# Patient Record
Sex: FEMALE | Race: OTHER | Employment: UNEMPLOYED | URBAN - METROPOLITAN AREA
[De-identification: names, ages, dates, MRNs, and addresses within clinical notes are randomized per-mention and may not be internally consistent; named-entity substitution may affect disease eponyms.]

---

## 2017-10-24 LAB
HBSAG, EXTERNAL: NORMAL
HEPATITIS C AB,   EXT: NORMAL
HIV, EXTERNAL: NORMAL
RPR, EXTERNAL: NORMAL
RUBELLA, EXTERNAL: NORMAL

## 2017-11-14 LAB
CHLAMYDIA, EXTERNAL: NORMAL
N. GONORRHEA, EXTERNAL: NORMAL

## 2018-05-20 ENCOUNTER — HOSPITAL ENCOUNTER (EMERGENCY)
Age: 28
Discharge: HOME | End: 2018-05-20
Attending: OBSTETRICS & GYNECOLOGY | Admitting: OBSTETRICS & GYNECOLOGY
Payer: MEDICAID

## 2018-05-20 VITALS — BODY MASS INDEX: 30.66 KG/M2 | WEIGHT: 157 LBS

## 2018-05-20 PROBLEM — O36.8130 DECREASED FETAL MOVEMENT AFFECTING MANAGEMENT OF PREGNANCY IN THIRD TRIMESTER: Status: ACTIVE | Noted: 2018-05-20

## 2018-05-20 PROCEDURE — 99282 EMERGENCY DEPT VISIT SF MDM: CPT

## 2018-05-20 NOTE — DISCHARGE INSTRUCTIONS
Scott Scarce a luevano médico zoraida el embarazo (después de 20 semanas) - [ Learning About When to Call Your Doctor During Pregnancy (After 20 Weeks) ]  Instrucciones de cuidado  Es normal que tenga inquietudes acerca de lo que podría ser un problema zoraida el Aly Hose. Aunque la mayoría de las mujeres embarazadas no tienen ningún problema grave, es importante saber cuándo llamar a luevano médico si tiene determinados síntomas o señales de trabajo de [place]. Estas son algunas sugerencias generales. Luevano médico puede darle más información sobre cuándo llamar. Cuándo llamar a luevano médico (después de 20 semanas)  Llame al 911 en cualquier momento que sospeche que puede necesitar atención de [place]. Por ejemplo, llame si:  · Tiene sangrado vaginal intenso. · Tiene dolor repentino e intenso en el abdomen. · Se desmayó (perdió el conocimiento). · Tiene maximo convulsión. · Ve o siente el cordón umbilical.  · Rosalba que está a punto de luis a meme a luevano bebé y no puede llegar en forma lai al hospital.  Echo Freeman a luevano médico ahora mismo o busque atención médica inmediata si:  · Tiene sangrado vaginal.  · Tiene dolor en el abdomen. · Tiene fiebre. · Tiene síntomas de preeclampsia, tales kenyatta:  ¨ Hinchazón repentina de la lesly, las yonathan o los pies. ¨ Problemas nuevos con la visión (kenyatta oscurecimiento de la visión o visión borrosa). ¨ Dolor de danilo intenso. · Tiene maximo pérdida repentina de líquido por la vagina. (Piensa que rompió la lulu). · Rosalba que puede estar en [place]. [place] significa que usted ha tenido al menos 4 contracciones en 20 minutos o al menos 8 contracciones en [place]. · Nota que luevano bebé ha dejado de moverse o lo hace mucho menos de lo habitual.  · Tiene síntomas de maximo infección del tracto urinario. Estos pueden incluir:  ¨ Dolor o ardor al orinar. ¨ Necesidad de orinar con frecuencia sin poder eliminar mucha orina.   ¨ Dolor en el flanco, que se encuentra traci debajo de la caja torácica y [place] de la cintura en un lado de la espalda. ¨ Brad en la orina. Preste especial atención a los cambios en luevano cecilio y asegúrese de comunicarse con luevano médico si:  · Tiene flujo vaginal con un olor desagradable. · Tiene cambios en la piel, tales kenyatta:  ¨ Salpullido. ¨ Comezón. ¨ Color amarillento en la piel. · Tiene otras inquietudes acerca de luevano embarazo. Si tiene signos de trabajo de parto al llegar a las [address] [facility] o más  Si tiene señales de [place] de parto a las 37 semanas o [place], es posible que luevano médico le diga que llame cuando luevano trabajo de parto se vuelva más [place]. Los síntomas del trabajo de parto activo incluyen:  · Contracciones que son regulares. · Contracciones a intervalos de menos de 5 minutos. · Contracciones zoraida las cuales es difícil hablar. La atención de seguimiento es maximo parte clave de luevano tratamiento y seguridad. Asegúrese de hacer y acudir a todas las citas, y llame a luevano médico si está teniendo problemas. También es maximo buena idea saber los resultados de los exámenes y mantener maximo lista de los medicamentos que blanca. ¿Dónde puede encontrar más información en inglés? Ahbi Goldman a [email or web address]://tila-shalom.info/. Escriba R372 en la búsqueda para aprender más acerca de \"Aprenda cuándo llamar a luevano médico zoraida el embarazo (después de 20 semanas) - [ Learning About When to Call Your Doctor During Pregnancy (After 20 Weeks) ]. \"  Revisado: 16 marzo, 2017  Versión del contenido: 11.4  © 6394-0478 Healthwise, Incorporated. Las instrucciones de cuidado fueron adaptadas bajo licencia por [employer] (which disclaims liability or warranty for this information). Si usted tiene [place] afección médica o sobre estas instrucciones, siempre pregunte a luevano profesional de cecilio. [employer]orporated niega toda garantía o responsabilidad por luevano uso de esta información. Conteo de las patadas de luevano bebé:  Instrucciones de cuidado - [ Colin Melissa Your Baby's Kicks: Care Instructions ]  Instrucciones de cuidado    Contar las patadas de luevano bebé es maximo manera en la que luevano médico puede establecer si luevano bebé es saludable. La mayoría de las [facility], sobre todo en el primer embarazo, siente que luevano bebé se mueve por primera vez entre las semanas 12 y 25. El movimiento puede sentirse más kenyatta aleteos que kenyatta patadas. Es posible que luevano bebé se mueva más a ciertas horas del día. Cuando usted [place], puede notar menos patadas que cuando está descansando. En lucita visitas prenatales, luevano médico le preguntará si el bebé está activo. En el último trimestre, luevano médico le puede pedir que cuente la cantidad de veces que sienta que el bebé se [place]. La atención de seguimiento es maximo parte clave de luevano tratamiento y seguridad. Asegúrese de hacer y acudir a todas las citas, y llame a luevano médico si está teniendo problemas. También es maximo buena idea saber los resultados de los exámenes y mantener maximo lista de los medicamentos que blanca. ¿Cómo se cuentan las patadas fetales? · Un método común para revisar el movimiento de luevano bebé es contar la cantidad de patadas o movimientos que sienta en 1 hora. Es normal sentir 10 movimientos (kenyatta patadas, aleteos o vueltas) en 1 hora. Algunos médicos sugieren que cuente zoraida la [facility] llegar a los 10 movimientos. Luego usted puede dejar de contar por raulito día y comenzar otra vez al día siguiente. · Para contar, elija el momento del día en que luevano bebé esté más activo. Podría ser cualquier momento entre la mañana y la noche. · Si no siente 10 movimientos en maximo hora, es posible que luevano bebé esté durmiendo. Espere hasta la próxima hora y vuelva a contar. ¿Cuándo debe pedir ayuda? Llame a luevano médico ahora mismo o busque atención médica inmediata si:  ?  · Siente que luevano bebé ha dejado de moverse o se mueve mucho menos de lo normal.   ?Preste especial atención a los cambios en luevano cecilio y asegúrese de comunicarse con luevano médico si tiene cualquier problema. ¿Dónde puede encontrar más información en inglés? [place] a http://tila-shalom.info/. Seth Veras T350 en la búsqueda para aprender más acerca de \"Conteo de las patadas de luevano bebé: Instrucciones de cuidado - [ Counting Your Baby's Kicks: Care Instructions ]. \"  Revisado: 16 marzo, 2017  Versión del contenido: 11.4  © 0315-0017 Healthwise, Incorporated. Las instrucciones de cuidado fueron adaptadas bajo licencia por Good [employer] (which disclaims liability or warranty for this information). Si usted tiene [place] afección médica o sobre estas instrucciones, siempre pregunte a luevano profesional de cecilio. Healthwise, Incorporated niega toda garantía o responsabilidad por luevano uso de esta información. Teresita Barrera - [ Learning About Pregnancy ]  Instrucciones de cuidado    Luevano cecilio zoraida las primeras semanas del embarazo es de particular importancia para la cecilio de luevano bebé. Cuídese. Cualquier cosa que perjudique luevano organismo puede perjudicar a luevano bebé. Asegúrese de asistir a todas lucita citas médicas. Los chequeos médicos regulares les ayudarán a usted y a luevano bebé a mantenerse saludables. La atención de seguimiento es maximo parte clave de luevano tratamiento y seguridad. Asegúrese de hacer y acudir a todas las citas, y llame a luevano médico si está teniendo problemas. También es maximo buena idea saber los resultados de los exámenes y mantener maximo lista de los medicamentos que blanca. ¿Cómo puede cuidarse en el hogar? ? Dieta  ? · Siga maximo dieta balanceada. Asegúrese de incluir en luevano dieta abundantes frijoles (habichuelas), arvejas (chícharos) y verduras de hojas verdes. ? · No se saltee las comidas ni pase muchas horas sin comer. Si tiene náuseas, trate de comer un refrigerio pequeño y saludable cada 2 a 3 horas. ? · No consuma pescados que tengan [address] de jared, kenyatta tiburón, pez arthur o [place]. No coma más de maximo abraham de atún a la semana. ? · John abundantes líquidos, suficientes para que luevano orina sea de color amarillo larry o transparente kenyatta el agua. Si tiene [employer], el corazón o el hígado y tiene que Nathan's líquidos, hable con luevano médico antes de aumentar luevano consumo. ? · Reduzca la cafeína, kenyatta el café, el té y las bebidas de cola. ? · No john alcohol, kenyatta cerveza, vino o licor el. ? · [place] un multivitamínico que contenga al menos 400 microgramos (mcg) de ácido fólico para ayudar a prevenir los defectos congénitos (de nacimiento). El cereal enriquecido y el kauffman integral son buenas anthony adicionales de ácido fólico.   ? · Aumente el calcio en luevano Patsey Her. Trate de beber un cuarto de galón de [employer]. También podría rinku suplementos de calcio y elegir alimentos kenyatta el queso y el yogur. ? [address]  ? · Asegúrese de asistir a las citas de seguimiento. ? · Descanse lo suficiente. Mientras esté embarazada podría sentirse más cansada de lo normal.   ? · Dasha por lo menos 30 minutos de ejercicio la mayoría de los días de la [place]. Caminar es maximo buena opción. Si no ha hecho ejercicio en el pasado, comience poco a poco. Dasha varias caminatas cortas todos los días. ? · No fume. Si necesita ayuda para dejar de fumar, hable con luevano médico sobre los programas para dejar de fumar. Éstos pueden aumentar lucita probabilidades de dejar el hábito para siempre. ? · No toque heces de gianna ni luevano caja de excrementos. Además, lávese las yonathan luego de manipular carne cruda y cocine nataliia toda la carne antes de comerla. Use guantes cuando trabaje en el pete y [place] yonathan cuando termine. Las heces de [place], la carne cruda o poco cocida, y la dre contaminada pueden causar infecciones que podrían perjudicar a luevano bebé o conducir a un aborto espontáneo. ? · No use \"jacuzzis\" ni saunas. Elevar la temperatura corporal podría perjudicar a luevano bebé.    ? · Evite los gases de las sustancias químicas y la pintura, o los venenos. ? · No use drogas ilegales ni john alcohol. Medicamentos  ? · Revise todos los medicamentos que esté tomando con luevano [place] sea necesario cambiar algunos de lucita medicamentos de rutina para proteger al bebé. ? · [place] acetaminofén (Tylenol) para [place], kenyatta dolor de [place] o de espalda leves o fiebre leve con síntomas de resfriado. No utilice medicamentos antiinflamatorios no esteroideos (CÉSAR), tales kenyatta ibuprofeno (Advil, Motrin) o naproxeno (Aleve), a menos que luevano médico lo autorice. ? · No tome dos o más analgésicos (medicamentos para el dolor) al [employer] tiempo a menos que el médico se lo haya indicado. Muchos analgésicos contienen acetaminofén, es decir, Tylenol. El exceso de acetaminofén (Tylenol) puede ser dañino. ? · [employer]onic exactamente kenyatta le fueron recetados. Llame a luevano médico si eduard estar teniendo problemas con luevano medicamento. ?[address]  ? · Si siente náuseas al levantarse, pruebe rinku un refrigerio pequeño (kenyatta galletas saladas) antes de salir de la cama. Dese algún tiempo para digerir el refrigerio y salga de la cama lentamente. ? · No se saltee las comidas ni pase mucho tiempo sin comer. Un estómago [address] náuseas. ? · Consuma comidas pequeñas y frecuentes en lugar de nat comidas abundantes al día. ? · [place] abundantes líquidos. Las bebidas deportivas, kenyatta Gatorade o [place], son buenas opciones. ? · Consuma alimentos ricos en proteínas mikayla bajos en grasas. ? · Si está tomando suplementos de viviane, pregúntele a luevano médico si son necesarios. El viviane puede [employer]. ? · Evite cualquier [employer] produzca náuseas, kenyatta el del café. ? · Descanse mucho. Las náuseas del embarazo podrían empeorar si está cansada. ¿Dónde puede encontrar más información en inglés? [place] a http://tila-shalom.info/.   Gemma Cerna [id number] en la búsqueda para aprender más acerca de \"Aprenda sobre el [place] - [ Maegan Miguel About Pregnancy ]. \"  Revisado: 16 marzo, 2017  Versión del contenido: 11.4  © 1035-0585 Healthwise, [employer]. Las instrucciones de cuidado fueron adaptadas bajo licencia por Good Help Connections (which disclaims liability or warranty for this information). Si usted tiene [place] afección médica o sobre estas instrucciones, siempre pregunte a luevano profesional de cecilio. [employer], [employer] niega toda garantía o responsabilidad por luevano uso de esta información.

## 2018-05-20 NOTE — IP AVS SNAPSHOT
[facility] 
 
 
 [address] 
[phone] Patient: Florian Jewell MRN: [id number] CAC:[id number] A check manan indicates which time of day the medication should be taken. My Medications ASK your doctor about these medications Instructions Each Dose to Equal  
 Morning Noon Evening Bedtime  
 ferrous sulfate 325 mg (65 mg iron) EC tablet Commonly known as:  IRON Your last dose was: Your next dose is: Take 1 Tab by mouth daily. Indications: Iron Deficiency Anemia 325 mg  
    
   
   
   
  
 prenatal vit-iron fumarate-fa 27 mg iron- 0.8 mg Tab tablet Your last dose was: Your next dose is: Take 1 Tab by mouth daily. Indications: pregnancy 1 Tab  
    
   
   
   
  
 promethazine 25 mg tablet Commonly known as:  PHENERGAN Your last dose was: Your next dose is: Take 1 Tab by mouth every eight (8) hours as needed for Nausea. Indications: EXCESSIVE VOMITING IN PREGNANCY  25 mg

## 2018-05-20 NOTE — IP AVS SNAPSHOT
[place] [address] [place]  
[phone] Patient: Alberto Pickup MRN: [id number] About your hospitalization You were admitted on:  N/A You last [place] in the[place]  [facility] [place] You were discharged on:  May 20, 2018 Why you were hospitalized Your primary diagnosis was:  Not on File Your diagnoses also included:  Decreased Fetal Movement Affecting Management Of Pregnancy In Third Trimester Follow-up Information Follow up With Details Comments Contact Info None   None (395) Patient stated that they have no PCP Discharge Orders None A check manan indicates which time of day the medication should be taken. My Medications ASK your doctor about these medications Instructions Each Dose to Equal  
 Morning Noon Evening Bedtime  
 ferrous sulfate 325 mg (65 mg iron) EC tablet Commonly known as:  IRON Your last dose was: Your next dose is: Take 1 Tab by mouth daily. Indications: Iron Deficiency Anemia 325 mg  
    
   
   
   
  
 prenatal vit-iron fumarate-fa 27 mg iron- 0.8 mg Tab tablet Your last dose was: Your next dose is: Take 1 Tab by mouth daily. Indications: pregnancy 1 Tab  
    
   
   
   
  
 promethazine 25 mg tablet Commonly known as:  PHENERGAN Your last dose was: Your next dose is: Take 1 Tab by mouth every eight (8) hours as needed for Nausea. Indications: EXCESSIVE VOMITING IN PREGNANCY 25 mg Discharge Instructions Aprenda cuándo llamar a luevano médico zoraida el embarazo (después de 20 semanas) - [ Learning About When to Call Your Doctor During Pregnancy (After 20 Weeks) ] Instrucciones de cuidado Es normal que tenga inquietudes acerca de lo que podría ser un problema Leroy Cabello. Aunque la mayoría de las mujeres embarazadas no tienen ningún problema grave, es importante saber cuándo llamar a luevano médico si tiene determinados síntomas o señales de trabajo de [place]. Estas son algunas sugerencias generales. Luevano médico puede darle más información sobre cuándo llamar. Cuándo llamar a luevano médico (después de 20 semanas) Llame al 911 en cualquier momento que sospeche que puede necesitar atención de [place]. Por ejemplo, llame si: · Tiene sangrado vaginal intenso. · Tiene dolor repentino e intenso en el abdomen. · Se desmayó (perdió el conocimiento). · Tiene maximo convulsión. · Ve o siente el cordón umbilical. 
· Rosalba que está a punto de luis a meme a luevano bebé y no puede llegar en forma lai al hospital. 
Lorrayne Kasey a luevano médico ahora mismo o busque atención médica inmediata si: · Tiene sangrado vaginal. 
· Tiene dolor en el abdomen. · Tiene fiebre. · Tiene síntomas de preeclampsia, tales kenyatta: 
¨ Hinchazón repentina de la lesly, las yonathan o los pies. ¨ Problemas nuevos con la visión (kenyatta oscurecimiento de la visión o visión borrosa). ¨ Dolor de danilo intenso. · Tiene maximo pérdida repentina de líquido por la vagina. (Piensa que rompió la lulu). · Rosalba que puede estar en Flateyri. [place] significa que usted ha tenido al menos 4 contracciones en 20 minutos o al menos 8 contracciones en [employer]. · Nota que luevano bebé ha dejado de moverse o lo hace mucho menos de lo habitual. 
· Tiene síntomas de maximo infección del tracto urinario. Estos pueden incluir: ¨ Dolor o ardor al orinar. ¨ Necesidad de orinar con frecuencia sin poder eliminar mucha orina. ¨ Dolor en el flanco, que se encuentra traci debajo de la caja torácica y [place] de la cintura en un lado de la espalda. ¨ Brad en la orina. Preste especial atención a los cambios en luevano cecilio y asegúrese de comunicarse con luevano médico si: · Tiene flujo vaginal con un olor desagradable. · Tiene cambios en la piel, tales kenyatta: 
¨ Salpullido. ¨ Comezón. ¨ Color amarillento en la piel. · Tiene otras inquietudes acerca de luevano embarazo. Si tiene signos de trabajo de parto al llegar a las [address] Si tiene señales de [place] a las [address] [facility] o [address] [facility], es posible que luevano médico le diga que llame cuando luevano trabajo de parto se vuelva [place] na [place]. Los síntomas del trabajo de parto activo incluyen: · Contracciones que son regulares. · Contracciones a intervalos de menos de 5 minutos. · Contracciones zoraida las cuales es difícil hablar. La atención de seguimiento es maximo parte clave de luevano tratamiento y seguridad. Asegúrese de hacer y acudir a todas las citas, y llame a luevano médico si está teniendo problemas. También es maximo buena idea saber los resultados de los exámenes y mantener maximo lista de los medicamentos que blanca. Dónde puede encontrar más información en inglés? Sreedhar Waldron a [email or web address].info/. Escriba Q155 en la búsqueda para aprender más acerca de \"Aprenda cuándo llamar a luevano médico zoraida el embarazo (después de 20 semanas) - [ Learning About When to Call Your Doctor During Pregnancy (After 20 Weeks) ]. \" 
Revisado: 16 marzo, 2017 Versión del contenido: 11.4 © 7649-4255 Healthwise, Incorporated. Las instrucciones de cuidado fueron adaptadas bajo licencia por Good Help Connections (which disclaims liability or warranty for this information). Si usted tiene Montandon Haiku afección médica o sobre estas instrucciones, siempre pregunte a luevano profesional de cecilio. Healthwise, Incorporated niega toda garantía o responsabilidad por luevano uso de esta información. Conteo de las patadas de luevano bebé: Instrucciones de cuidado - [ Counting Your Baby's Kicks: Care Instructions ] Instrucciones de cuidado Contar las patadas de luevano bebé es maximo manera en la que luevano médico puede establecer si luevano bebé es saludable.  [address], [facility] Mara harvey en el primer embarazo, siente que luevano bebé se mueve por primera vez entre las semanas 12 y 25. El movimiento puede sentirse más kenyatta aleteos que kenyatta patadas. Es posible que luevano bebé se mueva más a ciertas horas del día. Cuando usted [place], puede notar menos patadas que cuando está descansando. En lucita visitas prenatales, luevano médico le preguntará si el bebé está activo. En el último trimestre, luevano médico le puede pedir que cuente la cantidad de veces que sienta que el bebé se [place]. La atención de seguimiento es maximo parte clave de luevano tratamiento y seguridad. Asegúrese de hacer y acudir a todas las citas, y llame a luevano médico si está teniendo problemas. También es maximo buena idea saber los resultados de los exámenes y mantener maximo lista de los medicamentos que blanca. Cómo se cuentan las patadas fetales? · Un método común para revisar el movimiento de luevano bebé es contar la cantidad de patadas o movimientos que sienta en 1 hora. Es normal sentir 10 movimientos (kenyatta patadas, aleteos o vueltas) en 1 hora. Algunos médicos sugieren que cuente zoraida la [facility] llegar a los 10 movimientos. Luego usted puede dejar de contar por raulito día y comenzar otra vez al día siguiente. · Para contar, elija el momento del día en que luevano bebé esté más activo. Podría ser cualquier momento entre la mañana y la noche. · Si no siente 10 movimientos en maximo hora, es posible que luevano bebé esté durmiendo. Espere hasta la próxima hora y vuelva a contar. Cuándo debe pedir ayuda? Llame a luevano médico ahora mismo o busque atención médica inmediata si: 
? · Siente que luevano bebé ha dejado de moverse o se mueve mucho menos de lo normal. ?Preste especial atención a los cambios en luevano cecilio y asegúrese de comunicarse con luevano médico si tiene cualquier problema. Dónde puede encontrar más información en inglés? [place] a [email or web address]-[email or web address].[email or web address]  
Dio [place] en la búsqueda para aprender más acerca de \"Conteo de las patadas de luevano bebé: Instrucciones de cuidado - [ Counting Your Baby's Kicks: Care Instructions ]. \" 
Revisado: 16 marzo, 2017 Versión del contenido: 11.4 © 2737-2201 Healthwise, Incorporated. Las instrucciones de cuidado fueron adaptadas bajo licencia por Good Help Connections (which disclaims liability or warranty for this information). Si usted tiene Herscher Polo afección médica o sobre estas instrucciones, siempre pregunte a luevano profesional de cecilio. Healthwise, Incorporated niega toda garantía o responsabilidad por luevano uso de esta información. Mario Gitelman - [ Learning About Pregnancy ] Instrucciones de cuidado Luevano cecilio zoraida las primeras semanas del Maverick Ledezma es de particular importancia para la cecilio de luevano bebé. Cuídese. Cualquier cosa que perjudique luevano organismo puede perjudicar a luevano bebé. Asegúrese de asistir a todas lucita citas médicas. Los chequeos médicos regulares les ayudarán a usted y a luevano bebé a mantenerse saludables. La atención de seguimiento es maximo parte clave de luevano tratamiento y seguridad. Asegúrese de hacer y acudir a todas las citas, y llame a luevano médico si está teniendo problemas. También es maximo buena idea saber los resultados de los exámenes y mantener maximo lista de los medicamentos que blanca. Cómo puede cuidarse en el hogar? ? Dieta ? · Siga maximo dieta balanceada. Asegúrese de incluir en luevano dieta abundantes frijoles (habichuelas), arvejas (chícharos) y verduras de hojas verdes. ? · No se saltee las comidas ni pase muchas horas sin comer. Si tiene náuseas, trate de comer un refrigerio pequeño y saludable cada 2 a 3 horas. ? · No consuma pescados que tengan [address] de jared, kenyatta tiburón, pez arthur o [place]. No coma más de maximo abraham de atún a la semana. ? · Samia abundantes líquidos, suficientes para que luevano orina sea de color amarillo larry o transparente kenyatta el agua.  Si tiene [employer] riñones, el corazón o el hígado y tiene que Nathan's líquidos, hable con luevano médico antes de aumentar luevano consumo. ? · Reduzca la cafeína, kenyatta el café, el té y las bebidas de cola. ? · No john alcohol, kenyatta cerveza, vino o licor el. ? · [place] un multivitamínico que contenga al menos 400 microgramos (mcg) de ácido fólico para ayudar a prevenir los defectos congénitos (de nacimiento). El cereal enriquecido y el kauffman integral son buenas anthony adicionales de ácido fólico.  
? · Aumente el calcio en luevano Jerilee Rafita. Trate de beber un cuarto de galón de "[employer]. También podría rinku suplementos de calcio y elegir alimentos kenyatta el queso y el yogur. ? [address] ? · Asegúrese de asistir a las citas de seguimiento. ? · Descanse lo suficiente. Mientras esté embarazada podría sentirse más cansada de lo normal.  
? · Dasha por lo menos 30 minutos de ejercicio la mayoría de los días de la [place]. Caminar es maximo buena opción. Si no ha hecho ejercicio en el pasado, comience poco a poco. Dasha varias caminatas cortas todos los días. ? · No fume. Si necesita ayuda para dejar de fumar, hable con luevano médico sobre los programas para dejar de fumar. Éstos pueden aumentar lucita probabilidades de dejar el hábito para siempre. ? · No toque heces de gianna ni luevano caja de excrementos. Además, lávese las yonathan luego de manipular carne cruda y cocine nataliia toda la carne antes de comerla. Use guantes cuando trabaje en el jardín y [employer] yonathan cuando termine. Las heces de [place], la carne cruda o poco cocida, y la dre contaminada pueden causar infecciones que podrían perjudicar a luevano bebé o conducir a un aborto espontáneo. ? · No use \"jacuzzis\" ni saunas. Elevar la temperatura corporal podría perjudicar a luevano bebé. ? · Evite los gases de las sustancias químicas y la pintura, o los venenos. ? · No use drogas ilegales ni john alcohol. Medicamentos ? · Revise todos los medicamentos que esté tomando con luevano Gerarda Raven sea necesario cambiar algunos de lucita medicamentos de rutina para proteger al bebé. ? · [place] acetaminofén (Tylenol) para [place], kenyatta dolor de [place] o de espalda leves o fiebre leve con síntomas de resfriado. No utilice medicamentos antiinflamatorios no esteroideos (CSÉAR), tales kenyatta ibuprofeno (Advil, Motrin) o naproxeno (Aleve), a menos que luevano médico lo autorice. ? · No tome dos o más analgésicos (medicamentos para el dolor) al [employer] tiempo a menos que el médico se lo haya indicado. Muchos analgésicos contienen acetaminofén, es decir, Tylenol. El exceso de acetaminofén (Tylenol) puede ser dañino. ? · Med[employer]onic exactamente kenyatta le fueron recetados. Llame a luevano médico si eduard estar teniendo problemas con luevano medicamento. [address] ? · Si siente náuseas al levantarse, pruebe rinku un refrigerio pequeño (kenyatta galletas saladas) antes de salir de la cama. Dese algún tiempo para digerir el refrigerio y salga de la cama lentamente. ? · No se saltee las comidas ni pase mucho tiempo sin comer. Un estómago [address] náuseas. ? · Consuma comidas pequeñas y frecuentes en lugar de nat comidas abundantes al día. ? · [place] abundantes líquidos. Las bebidas deportivas, kenyatta Gatorade o [place], son buenas opciones. ? · Consuma alimentos ricos en proteínas mikayla bajos en grasas. ? · Si está tomando suplementos de viviane, pregúntele a luevano médico si son necesarios. El viviane puede [employer]. ? · Evite cualquier [employer] produzca náuseas, kenyatta el del café. ? · Descanse mucho. Las náuseas del embarazo podrían empeorar si está cansada. Dónde puede encontrar más información en inglés? Richard Kirby a [email or web address]-shalom.info/. Castillo Valdez K757 en la búsqueda para aprender más acerca de \"Aprenda sobre el Odean Primrose - [ Oneda Crape About Pregnancy ]. \" Revisado: 16 marzo, 2017 Versión del contenido: 11.4 © 3168-3935 Healthwise, Incorporated. Las instrucciones de cuidado fueron adaptadas bajo licencia por Good Help Connections (which disclaims liability or warranty for this information). Si usted tiene Butler Jermyn afección médica o sobre estas instrucciones, siempre pregunte a luevano profesional de cecilio. Healthwise, Incorporated niega toda garantía o responsabilidad por luevano uso de esta información. Introducing [facility] SERVICES! [facility] introduce portal paciente MyChart . Ahora se puede acceder a partes de luevano expediente médico, enviar por correo electrónico la oficina de luevano médico y solicitar renovaciones de medicamentos en línea. En luevano navegador de Internet , Sheela Almendarezume a [email or web address] Bernie clic en el usuario por Jayshree Horn? Nonda Slates clic aquí en la sesión Yadi Aver. Verá la página de registro [place]. Ingrese luevano código de [employer] masood y kenyatta aparece a continuación. Usted no tendrá que [employer] código después de brent completado el proceso de registro . Si usted no se inscribe antes de la fecha de caducidad , debe solicitar un nuevo código. · MyChart Código de acceso : [id number] Expires: 7/3/2018  4:45 PM 
 
Ingresa los últimos cuatro dígitos de luevano Número de Seguro Social ( xxxx ) y fecha de nacimiento ( dd / mm / aaaa ) kenyatta se indica y bernie clic en Enviar. Diamante será llevado a la siguiente página de registro . Crear un ID MyChart . Esta será luevano ID de inicio de sesión de MyChart y no puede ser [place] , por lo que pensar en maxiom que es Adam Coil y fácil de recordar . Crear maximo contraseña MyChart . Usted puede cambiar luevano contraseña en cualquier momento . Ingrese luevano Password Reset de preguntas y Lau . [place] se puede utilizar en un momento posterior si usted olvida luevano contraseña.  
Introduzca luevano dirección de correo electrónico . Armando Larose recibirá David Rash notificación por correo electrónico cuando la nueva información está disponible en MyChart . Marielena Late clic en Registrarse. [place] felisa y descargar porciones de luevano expediente médico. 
Dasha clic en el enlace de descarga del menú Resumen para descargar maximo copia portátil de luevano información médica . Si tiene [employer] , por favor visite la sección de preguntas frecuentes del sitio web MyChart . Recuerde, MyChart NO es que se utilizará para las necesidades urgentes. Para emergencias médicas , llame al 911 . Ahora disponible en luevano iPhone y Android ! Introducing Abhi Martínez As a [employer] patient, I wanted to make you aware of our electronic visit tool called Abih Martínez. [employer] 24/7 allows you to connect within minutes with a medical provider 24 hours a day, seven days a week via a mobile device or tablet or logging into a secure website from your computer. You can access Abhi Martínez from anywhere in the [place]. A virtual visit might be right for you when you have a simple condition and feel like you just dont want to get out of bed, or cant get away from work for an appointment, when your regular [employer] provider is not available (evenings, weekends or holidays), or when youre out of town and need minor care. Electronic visits cost only $49 and if the [employer] 24/7 provider determines a prescription is needed to treat your condition, one can be electronically transmitted to a nearby pharmacy*. Please take a moment to enroll today if you have not already done so. The enrollment process is free and takes just a few minutes. To enroll, please download the [employer] 24/7 diana to your tablet or phone, or visit [email or web address] to enroll on your computer.    
And, as an [address] [place] [address] patient with a [employer] account, the results of your visits will be scanned into your electronic medical record and your primary care provider will be able to view the scanned results. We urge you to continue to see your regular [facility] provider for your ongoing medical care. And while your primary care provider may not be the one available when you seek a Abhi Shaikhfin virtual visit, the peace of mind you get from getting a real diagnosis real time can be priceless. For more information on "[employer]kaylafin, view our Frequently Asked Questions (FAQs) at [email or web address]. Sincerely, 
 
Carmencita Ojeda MD 
Chief Medical Officer [facility] Ramila Cedillo *:  certain medications cannot be prescribed via "[employer]kaylafin Providers Seen During Your Hospitalization Provider Specialty Primary office phone Eugenia Rosario MD Obstetrics & Gynecology [phone] Your Primary Care Physician (PCP) Primary Care Physician Office Phone Office Fax NONE ** None ** ** None ** You are allergic to the following No active allergies Recent Documentation Weight BMI OB Status Smoking Status 71.2 kg 30.66 kg/m2 Pregnant Never Smoker Emergency Contacts Name Discharge Info Relation Home Work Mobile Ivana Yeh  Friend [5] [phone] Patient Belongings The following personal items are in your possession at time of discharge: 
                             
 
  
  
 Please provide this summary of care documentation to your next provider. Signatures-by signing, you are acknowledging that this After Visit Summary has been reviewed with you and you have received a copy. Patient Signature:  ____________________________________________________________ Date:  ____________________________________________________________  
  
Larri Hazard Provider Signature:  ____________________________________________________________ Date:  ____________________________________________________________  
  
  
   
  
Luis Collado [address] [facility] 
[phone] Patient: Charly Simpson MRN: [id number] Sobre luevano hospitalización Le admitieron el:  N/A Luevano tratamiento más reciente fue el:  SFE 4 DARLENE Le dieron de sara el:  May 20, 2018 Por qué le ingresaron Luevano diagnosis primaria es:  No está archivado/a Luevano diagnosis también incluye:  Decreased Fetal Movement Affecting Management Of Pregnancy In Third Trimester Follow-up Information Follow up With Details Comments Contact Info None   None (395) Patient stated that they have no PCP Discharge Orders [facility] A check manan indicates which time of day the medication should be taken. My Medications CONSULTE con luevano médico sobre Seanodes Instructions Each Dose to Equal  
 Morning Noon Evening Bedtime  
 ferrous sulfate 325 mg (65 mg iron) EC tablet También conocido kenyatta:  IRON Your last dose was: Your next dose is: Take 1 Tab by mouth daily. Indications: Iron Deficiency Anemia 325 mg  
    
   
   
   
  
 prenatal vit-iron fumarate-fa 27 mg iron- 0.8 mg Tab tablet Your last dose was: Your next dose is: Take 1 Tab by mouth daily. Indications: pregnancy 1 Tab  
    
   
   
   
  
 promethazine 25 mg tablet También conocido kenyatta: PHENERGAN Your last dose was: Your next dose is: Take 1 Tab by mouth every eight (8) hours as needed for Nausea. Indications: EXCESSIVE VOMITING IN PREGNANCY 25 mg Instrucciones a luis de sara Aprenda cuándo llamar a luevano médico zoraida el embarazo (después de 20 semanas) - [ Learning About When to Call Your Doctor During Pregnancy (After 20 Weeks) ] Instrucciones de cuidado Es normal que tenga inquietudes acerca de lo que podría ser un problema zoraida el [facility]. Aunque la mayoría de las mujeres embarazadas no tienen ningún problema grave, es importante saber cuándo llamar a luevano médico si tiene determinados síntomas o señales de trabajo de [place]. Estas son algunas sugerencias generales. Luevano médico puede darle más información sobre cuándo llamar. Cuándo llamar a luevano médico (después de 20 semanas) Llame al 911 en cualquier momento que sospeche que puede necesitar atención de [place]. Por ejemplo, llame si: · Tiene sangrado vaginal intenso. · Tiene dolor repentino e intenso en el abdomen. · Se desmayó (perdió el conocimiento). · Tiene maximo convulsión. · Ve o siente el cordón umbilical. 
· Rosalba que está a punto de luis a meme a luevano bebé y no puede llegar en forma lai al hospital. 
Lanice Em a luevano médico ahora mismo o busque atención médica inmediata si: · Tiene sangrado vaginal. 
· Tiene dolor en el abdomen. · Tiene fiebre. · Tiene síntomas de preeclampsia, tales kenyatta: 
¨ Hinchazón repentina de la lesly, las yonathan o los pies. ¨ Problemas nuevos con la visión (kenyatta oscurecimiento de la visión o visión borrosa). ¨ Dolor de danilo intenso. · Tiene maximo pérdida repentina de líquido por la vagina. (Piensa que rompió la lulu). · Rosalba que puede estar en Flateyri. [place] significa que usted ha tenido al menos 4 contracciones en 20 minutos o al menos 8 contracciones en [employer]. · Nota que luevano bebé ha dejado de moverse o lo hace mucho menos de lo habitual. 
· Tiene síntomas de maximo infección del tracto urinario. Estos pueden incluir: ¨ Dolor o ardor al orinar. ¨ Necesidad de orinar con frecuencia sin poder eliminar mucha orina. ¨ Dolor en el flanco, que se encuentra traci debajo de la caja torácica y [place] de la cintura en un lado de la espalda. ¨ Brad en la orina.  
Preste especial atención a los cambios en luevano cecilio y asegúrese de comunicarse con luevano médico si: 
 · Tiene flujo vaginal con un olor desagradable. · Tiene cambios en la piel, tales kenyatta: 
¨ Salpullido. ¨ Comezón. ¨ Color amarillento en la piel. · Tiene otras inquietudes acerca de luevano embarazo. Si tiene signos de trabajo de parto al llegar a las [address] Si tiene señales de [place] a las [address] [facility] o [address] [facility], es posible que luevano médico le diga que llame cuando luevano trabajo de parto se vuelva [place] [facility]. Los síntomas del trabajo de parto activo incluyen: · Contracciones que son regulares. · Contracciones a intervalos de menos de 5 minutos. · Contracciones zoraida las cuales es difícil hablar. La atención de seguimiento es maximo parte clave de luevano tratamiento y seguridad. Asegúrese de hacer y acudir a todas las citas, y llame a luevano médico si está teniendo problemas. También es maximo buena idea saber los resultados de los exámenes y mantener maximo lista de los medicamentos que blanca. Dónde puede encontrar más información en inglés? Andrés Russell a [email or web address] Escriba F790 en la búsqueda para aprender más acerca de \"Aprenda cuándo llamar a luevano médico zoraida el embarazo (después de 20 semanas) - [ Learning About When to Call Your Doctor During Pregnancy (After 20 Weeks) ]. \" 
Revisado: 16 marzo, 2017 Versión del contenido: 11.4 © 0028-6201 Healthwise, Incorporated. Las instrucciones de cuidado fueron adaptadas bajo licencia por [employer] (which disclaims liability or warranty for this information). Si usted tiene [place] afección médica o sobre estas instrucciones, siempre pregunte a luevano profesional de cecilio. Healthwise, Incorporated niega toda garantía o responsabilidad por luevano uso de esta información. Conteo de las patadas de luevano bebé: Instrucciones de cuidado - [ Counting Your Baby's Kicks: Care Instructions ] Instrucciones de cuidado Contar las patadas de luevano bebé es maximo manera en la que luevano médico puede establecer si luevano bebé es saludable. La mayoría de las [facility], sobre todo en el primer embarazo, siente que luevano bebé se mueve por primera vez entre las semanas 12 y 25. El movimiento puede sentirse más kenyatta aleteos que kenyatta patadas. Es posible que luevano bebé se mueva más a ciertas horas del día. Cuando usted [place], puede notar menos patadas que cuando está descansando. En lucita visitas prenatales, luevano médico le preguntará si el bebé está activo. En el último trimestre, luevano médico le puede pedir que cuente la cantidad de veces que sienta que el bebé se [place]. La atención de seguimiento es maximo parte clave de luevano tratamiento y seguridad. Asegúrese de hacer y acudir a todas las citas, y llame a luevano médico si está teniendo problemas. También es maximo buena idea saber los resultados de los exámenes y mantener maximo lista de los medicamentos que blanca. Cómo se cuentan las patadas fetales? · Un método común para revisar el movimiento de luevano bebé es contar la cantidad de patadas o movimientos que sienta en 1 hora. Es normal sentir 10 movimientos (kenyatta patadas, aleteos o vueltas) en 1 hora. Algunos médicos sugieren que cuente zoraida la [facility] llegar a los 10 movimientos. Luego usted puede dejar de contar por raulito día y comenzar otra vez al día siguiente. · Para contar, elija el momento del día en que luevano bebé esté más activo. Podría ser cualquier momento entre la mañana y la noche. · Si no siente 10 movimientos en maximo hora, es posible que luevano bebé esté durmiendo. Espere hasta la próxima hora y vuelva a contar. Cuándo debe pedir ayuda? Llame a luevano médico ahora mismo o busque atención médica inmediata si: 
? · Siente que luevano bebé ha dejado de moverse o se mueve mucho menos de lo normal. ?Preste especial atención a los cambios en luevano cecilio y asegúrese de comunicarse con luevano médico si tiene cualquier problema. Dónde puede encontrar más información en inglés? Abhi Goldman a [email or web address] [place] M018 en la búsqueda para aprender más acerca de \"Conteo de las patadas de luevano bebé: Instrucciones de cuidado - [ Counting Your Baby's Kicks: Care Instructions ]. \" 
Revisado: 16 marzo, 2017 Versión del contenido: 11.4 © 7038-6102 Healthwise, Incorporated. Las instrucciones de cuidado fueron adaptadas bajo licencia por [employer] (which disclaims liability or warranty for this information). Si usted tiene [place] afección médica o sobre estas instrucciones, siempre pregunte a luevano profesional de cecilio. Healthwise, Incorporated niega toda garantía o responsabilidad por luevano uso de esta información. [place] - [ Learning About Pregnancy ] Instrucciones de cuidado Luevano cecilio zoraida las primeras semanas del Gerhardt Demarcus es de particular importancia para la cecilio de luevano bebé. Cuídese. Cualquier cosa que perjudique luevano organismo puede perjudicar a luevano bebé. Asegúrese de asistir a todas lucita citas médicas. Los chequeos médicos regulares les ayudarán a usted y a luevano bebé a mantenerse saludables. La atención de seguimiento es maximo parte clave de luevano tratamiento y seguridad. Asegúrese de hacer y acudir a todas las citas, y llame a luevano médico si está teniendo problemas. También es maximo buena idea saber los resultados de los exámenes y mantener maximo lista de los medicamentos que blanca. Cómo puede cuidarse en el hogar? ? Dieta ? · Siga maximo dieta balanceada. Asegúrese de incluir en luevano dieta abundantes frijoles (habichuelas), arvejas (chícharos) y verduras de hojas verdes. ? · No se saltee las comidas ni pase muchas horas sin comer. Si tiene náuseas, trate de comer un refrigerio pequeño y saludable cada 2 a 3 horas. ? · No consuma pescados que tengan [address] de jared, kenyatta tiburón, pez arthur o [place]. No coma más de maximo abraham de atún a la semana.   
? · Samia abundantes líquidos, suficientes para que luevano orina sea de color amarillo larry o transparente kenyatta el agua. Si tiene [employer], el corazón o el hígado y tiene que Nathan's líquidos, hable con luevano médico antes de aumentar luevano consumo. ? · Reduzca la cafeína, kenyatta el café, el té y las bebidas de cola. ? · No john alcohol, kenyatta cerveza, vino o licor el. ? · Isabella un multivitamínico que contenga al menos 400 microgramos (mcg) de ácido fólico para ayudar a prevenir los defectos congénitos (de nacimiento). El cereal enriquecido y el kauffman integral son buenas anthony adicionales de ácido fólico.  
? · Aumente el calcio en luevano Samy [place]. Trate de beber un cuarto de galón de [employer]. También podría rinku suplementos de calcio y elegir alimentos kenyatta el queso y el yogur. ? [address] ? · Asegúrese de asistir a las citas de seguimiento. ? · Descanse lo suficiente. Mientras esté embarazada podría sentirse más cansada de lo normal.  
? · Dasha por lo menos 30 minutos de ejercicio la mayoría de los días de la [place]. Caminar es maximo buena opción. Si no ha hecho ejercicio en el pasado, comience poco a poco. Dasha varias caminatas cortas todos los días. ? · No fume. Si necesita ayuda para dejar de fumar, hable con luevano médico sobre los programas para dejar de fumar. Éstos pueden aumentar lucita probabilidades de dejar el hábito para siempre. ? · No toque heces de gianna ni luevano caja de excrementos. Además, lávese las yonathan luego de manipular carne cruda y cocine nataliia toda la carne antes de comerla. Use guantes cuando trabaje en el jardín y [employer] yonathan cuando termine. Las heces de [place], la carne cruda o poco cocida, y la dre contaminada pueden causar infecciones que podrían perjudicar a luevano bebé o conducir a un aborto espontáneo. ? · No use \"jacuzzis\" ni saunas. Elevar la temperatura corporal podría perjudicar a luevano bebé. ? · Evite los gases de las sustancias químicas y la pintura, o los venenos. ? · No use drogas ilegales ni john alcohol. Medicamentos ? · Revise todos los medicamentos que esté tomando con luevano Shelvy Moh sea necesario cambiar algunos de lucita medicamentos de rutina para proteger al bebé. ? · [place] acetaminofén (Tylenol) para [facility], kenyatta dolor de [place] o de espalda leves o fiebre leve con síntomas de resfriado. No utilice medicamentos antiinflamatorios no esteroideos (CÉSAR), tales kenyatta ibuprofeno (Advil, Motrin) o naproxeno (Aleve), a menos que luevano médico lo autorice. ? · No tome dos o más analgésicos (medicamentos para el dolor) al [employer] tiempo a menos que el médico se lo haya indicado. Muchos analgésicos contienen acetaminofén, es decir, Tylenol. El exceso de acetaminofén (Tylenol) puede ser dañino. ? · Med[employer]onic exactamente kenyatta le fueron recetados. Llame a luevano médico si eduard estar teniendo problemas con luevano medicamento. ?[address] ? · Si siente náuseas al levantarse, pruebe rinku un refrigerio pequeño (kenyatta galletas saladas) antes de salir de la cama. Dese algún tiempo para digerir el refrigerio y salga de la cama lentamente. ? · No se saltee las comidas ni pase mucho tiempo sin comer. Un estómago [address] náuseas. ? · Consuma comidas pequeñas y frecuentes en lugar de nat comidas abundantes al día. ? · [place] abundantes líquidos. Las bebidas deportivas, kenyatta Gatorade o [place], son buenas opciones. ? · Consuma alimentos ricos en proteínas mikayla bajos en grasas. ? · Si está tomando suplementos de viviane, pregúntele a luevano médico si son necesarios. El viviane puede [employer]. ? · Evite cualquier [employer] produzca náuseas, kenyatta el del café. ? · Descanse mucho. Las náuseas del embarazo podrían empeorar si está cansada. Dónde puede encontrar más información en inglés? Sreedhar gonzalez [email or web address] Kadie Young K509 en la búsqueda para aprender más acerca de \"Aprenda sobre el [place] - [ Luli Cheadle About Pregnancy ]. \" 
Revisado: 16 marzo, 2017 Versión del contenido: 11.4 © 3184-8934 Healthwise, Incorporated. Las instrucciones de cuidado fueron adaptadas bajo licencia por [employer] (which disclaims liability or warranty for this information). Si usted tiene [place] afección médica o sobre estas instrucciones, siempre pregunte a luevano profesional de cecilio. Healthwise, Incorporated niega toda garantía o responsabilidad por luevano uso de esta información. Introducing [facility]! [facility] introduce portal paciente MyChart . Ahora se puede acceder a partes de luevano expediente médico, enviar por correo electrónico la oficina de luevano médico y solicitar renovaciones de medicamentos en línea. En luevano navegador de Internet , Shelli Remak a [email or web address] Bernie clic en el usuario por Samra Evans? [place] clic aquí en la sesión Karina Red. Verá la página de registro [place]. Ingrese luevano código de [facility] [place] masood y kenyatta aparece a continuación. Usted no tendrá que [employer] código después de brent completado el proceso de registro . Si usted no se inscribe antes de la fecha de caducidad , debe solicitar un nuevo código. · MyChart Código de acceso : [id number] Expires: 7/3/2018  4:45 PM 
 
Ingresa los últimos cuatro dígitos de luevano Número de Seguro Social ( xxxx ) y fecha de nacimiento ( dd / mm / aaaa ) kenyatta se indica y bernie clic en Enviar. mk será llevado a la siguiente página de registro . Crear un ID MyChart . Esta será luevano ID de inicio de sesión de MyChart y no puede ser [place] , por lo que pensar en maximo que es Dane Bassett y fácil de recordar . Crear maximo contraseña MyChart . Usted puede cambiar luevano contraseña en cualquier momento . Ingrese luevano Password Reset de preguntas y Lau .  Garey se puede utilizar en un momento posterior si usted olvida luevano contraseña. Introduzca luevano dirección de correo electrónico . Alok Castañeda recibirá maximo notificación por correo electrónico cuando la nueva información está disponible en MyChart . Linneus Alpine clic en Registrarse. Delilah Schwab felisa y descargar porciones de luevano expediente médico. 
Dasha clic en el enlace de descarga del menú Resumen para descargar maximo copia portátil de luevano información médica . Si tiene [employer] , por favor visite la sección de preguntas frecuentes del sitio web MyChart . Recuerde, MyChart NO es que se utilizará para las necesidades urgentes. Para emergencias médicas , llame al 911 . Ahora disponible en luevano iPhone y Android ! Introducing Abhi Martínez As a [facility] patient, I wanted to make you aware of our electronic visit tool called Abhi Martínez. [facility] 24/7 allows you to connect within minutes with a medical provider 24 hours a day, seven days a week via a mobile device or tablet or logging into a secure website from your computer. You can access Abhi Martínez from anywhere in the [place]. A virtual visit might be right for you when you have a simple condition and feel like you just dont want to get out of bed, or cant get away from work for an appointment, when your regular [facility] provider is not available (evenings, weekends or holidays), or when youre out of town and need minor care. Electronic visits cost only $49 and if the [facility] 24/Tistagames provider determines a prescription is needed to treat your condition, one can be electronically transmitted to a nearby pharmacy*. Please take a moment to enroll today if you have not already done so. The enrollment process is free and takes just a few minutes. To enroll, please download the [employer]/Tistagames diana to your tablet or phone, or visit [email or web address] to enroll on your computer. And, as an [address] [place] [address] patient with a [employer] account, the results of your visits will be scanned into your electronic medical record and your primary care provider will be able to view the scanned results. We urge you to continue to see your regular [facility] provider for your ongoing medical care. And while your primary care provider may not be the one available when you seek a Abhi Martínez virtual visit, the peace of mind you get from getting a real diagnosis real time can be priceless. For more information on Abhi Shaikhfin, view our Frequently Asked Questions (FAQs) at [email or web address]. Sincerely, 
 
Xin Mohr MD 
Chief Medical Officer [facility] Ramila Mamadou *:  certain medications cannot be prescribed via Abhi Bassettирина Providers Seen During Your Hospitalization Personal Médico [facility]special[facility] Teléfono principal de la oficina Mateus Chatterjee MD Obstetrics & Gynecology [phone] Bernardo médico de atención primaria (PCP ) Primary Care Physician Office Phone Office Fax NONE ** None ** ** None ** Tiene alergias a lo siguiente No tiene alergias Documentación recientes Weight BMI (IMC) Estado obstétrico Estatus de tabaquísmo 71.2 kg 30.66 kg/m2 Pregnant Never Smoker Emergency Contacts Name Discharge Info Relation Home Work Mobile RaoIvana  Friend [5] [phone] Patient Belongings The following personal items are in your possession at time of discharge: 
                             
 
  
  
 Please provide this summary of care documentation to your next provider. Signatures-by signing, you are acknowledging that this After Visit Summary has been reviewed with you and you have received a copy. Patient Signature:  ____________________________________________________________ Date:  ____________________________________________________________  
  
Demetria Faes Provider Signature:  ____________________________________________________________ Date:  ____________________________________________________________

## 2018-05-20 NOTE — IP AVS SNAPSHOT
Summary of Care Report The Summary of Care report has been created to help improve care coordination. Users with access to [employer] or [employer] [facility] (Web-based application) may access additional patient information including the Discharge Summary. If you are not currently a [address] user and need more information, please call the number listed below in the [place] section and ask to be connected with Medical Records. Facility Information Name Address Phone [address] [place] [address] Road [address] [place] [id number] [phone] Patient Information Patient Name Sex NADER Cruz ([id number]) Female 1990 Discharge Information Admitting Provider Service Area Unit Jagdeep Miller MD / [address] [facility] [address] / [phone] Discharge Provider Discharge Date/Time Discharge Disposition Destination (none) 2018 16:25 (Pending) AHR (none) Patient Language Language Moldovan [40] Hospital Problems as of 2018  Reviewed: 5/3/2018  9:39 AM by Deja Berg MD  
  
  
  
 Class Noted - Resolved Last Modified POA Active Problems Decreased fetal movement affecting management of pregnancy in third trimester  2018 - Present 2018 by Jagdeep Miller MD Unknown Entered by Jagdeep Miller MD  
  
Non-Hospital Problems as of 2018  Reviewed: 5/3/2018  9:39 AM by Deja Berg MD  
 None You are allergic to the following No active allergies Current Discharge Medication List  
  
ASK your doctor about these medications Dose & Instructions Dispensing Information Comments  
 ferrous sulfate 325 mg (65 mg iron) EC tablet Commonly known as:  IRON Dose:  325 mg Take 1 Tab by mouth daily. Indications: Iron Deficiency Anemia Quantity:  30 Tab Refills:  3 prenatal vit-iron fumarate-fa 27 mg iron- 0.8 mg Tab tablet Dose:  1 Tab Take 1 Tab by mouth daily. Indications: pregnancy Quantity:  30 Tab Refills:  12  
   
 promethazine 25 mg tablet Commonly known as:  PHENERGAN Dose:  25 mg Take 1 Tab by mouth every eight (8) hours as needed for Nausea. Indications: EXCESSIVE VOMITING IN PREGNANCY Quantity:  30 Tab Refills:  0 Follow-up Information Follow up With Details Comments Contact Info None   None (395) Patient stated that they have no PCP Discharge Instructions Aprenda cuándo llamar a luevano médico zoraida el embarazo (después de 20 semanas) - [ Learning About When to Call Your Doctor During Pregnancy (After 20 Weeks) ] Instrucciones de cuidado Es normal que tenga inquietudes acerca de lo que podría ser un problema zoraida el [facility]. Aunque la mayoría de las mujeres embarazadas no tienen ningún problema grave, es importante saber cuándo llamar a luevano médico si tiene determinados síntomas o señales de trabajo de Anthony. Estas son algunas sugerencias generales. Luevano médico puede darle más información sobre cuándo llamar. Cuándo llamar a luevano médico (después de 20 semanas) Llame al 911 en cualquier momento que sospeche que puede necesitar atención de [place]. Por ejemplo, llame si: · Tiene sangrado vaginal intenso. · Tiene dolor repentino e intenso en el abdomen. · Se desmayó (perdió el conocimiento). · Tiene maximo convulsión. · Ve o siente el cordón umbilical. 
· Rosalba que está a punto de luis a meme a luevano bebé y no puede llegar en forma lai al hospital. 
Roque Cisco a luevano médico ahora mismo o busque atención médica inmediata si: · Tiene sangrado vaginal. 
· Tiene dolor en el abdomen. · Tiene fiebre. · Tiene síntomas de preeclampsia, tales kenyatta: 
¨ Hinchazón repentina de la lesly, las yonathan o los pies. ¨ Problemas nuevos con la visión (kenyatta oscurecimiento de la visión o visión borrosa). ¨ Dolor de danilo intenso. · Tiene maximo pérdida repentina de líquido por la vagina. (Piensa que rompió la lulu). · Rosalba que puede estar en [place] significa que usted ha tenido al menos 4 contracciones en 20 minutos o al menos 8 contracciones en [employer]. · Nota que luevano bebé ha dejado de moverse o lo hace mucho menos de lo habitual. 
· Tiene síntomas de maximo infección del tracto urinario. Estos pueden incluir: ¨ Dolor o ardor al orinar. ¨ Necesidad de orinar con frecuencia sin poder eliminar mucha orina. ¨ Dolor en el flanco, que se encuentra traci debajo de la caja torácica y [place] de la cintura en un lado de la espalda. ¨ Brad en la orina. Preste especial atención a los cambios en luevano cecilio y asegúrese de comunicarse con luevano médico si: · Tiene flujo vaginal con un olor desagradable. · Tiene cambios en la piel, tales kenyatta: 
¨ Salpullido. ¨ Comezón. ¨ Color amarillento en la piel. · Tiene otras inquietudes acerca de luevano embarazo. Si tiene signos de trabajo de parto al llegar a las [address] Si tiene señales de Anny Duran a las [address] [facility] o [place], es posible que luevano médico le diga que llame cuando luevano trabajo de parto se vuelva [place] na [facility]. Los síntomas del trabajo de parto activo incluyen: · Contracciones que son regulares. · Contracciones a intervalos de menos de 5 minutos. · Contracciones zoraida las cuales es difícil hablar. La atención de seguimiento es maximo parte clave de luevano tratamiento y seguridad. Asegúrese de hacer y acudir a todas las citas, y llame a luevano médico si está teniendo problemas. También es maximo buena idea saber los resultados de los exámenes y mantener maximo lista de los medicamentos que blanca. Dónde puede encontrar más información en inglés? Markel Asotin a http://tila-shalom.info/.  
Escriba N531 en la búsqueda para aprender más acerca de \"Aprenda cuándo llamar a luevano médico zoraida el embarazo (después de 20 semanas) - [ Learning About When to Call Your Doctor During Pregnancy (After 20 Weeks) ]. \" 
Revisado: 16 marzo, 2017 Versión del contenido: 11.4 © 9846-4165 Healthwise, Incorporated. Las instrucciones de cuidado fueron adaptadas bajo licencia por [employer] (which disclaims liability or warranty for this information). Si usted tiene [place] afección médica o sobre estas instrucciones, siempre pregunte a luevano profesional de cecilio. Healthwise, Incorporated niega toda garantía o responsabilidad por luevano uso de esta información. Conteo de las patadas de luevano bebé: Instrucciones de cuidado - [ Counting Your Baby's Kicks: Care Instructions ] Instrucciones de cuidado Contar las patadas de luevano bebé es maximo manera en la que luevano médico puede establecer si luevano bebé es saludable. La mayoría de las [facility], sobre todo en el primer embarazo, siente que luevano bebé se mueve por primera vez entre las semanas 12 y 25. El movimiento puede sentirse más kenyatta aleteos que kenyatta patadas. Es posible que luevano bebé se mueva más a ciertas horas del día. Cuando usted [place], puede notar menos patadas que cuando está descansando. En lucita visitas prenatales, luevano médico le preguntará si el bebé está activo. En el último trimestre, luevano médico le puede pedir que cuente la cantidad de veces que sienta que el bebé se [place]. La atención de seguimiento es maximo parte clave de luevano tratamiento y seguridad. Asegúrese de hacer y acudir a todas las citas, y llame a luevano médico si está teniendo problemas. También es maximo buena idea saber los resultados de los exámenes y mantener maximo lista de los medicamentos que blanca. Cómo se cuentan las patadas fetales? · Un método común para revisar el movimiento de luevano bebé es contar la cantidad de patadas o movimientos que sienta en 1 hora. Es normal sentir 10 movimientos (kenyatta patadas, aleteos o vueltas) en 1 hora.  Algunos médicos sugieren que cuente zoraida la [facility] llegar a los 10 movimientos. Luego usted puede dejar de contar por raulito día y comenzar otra vez al día siguiente. · Para contar, elija el momento del día en que bernardo bebé esté más activo. Podría ser cualquier momento entre la mañana y la noche. · Si no siente 10 movimientos en maximo hora, es posible que bernardo bebé esté durmiendo. Espere hasta la próxima hora y vuelva a contar. Cuándo debe pedir ayuda? Llame a bernardo médico ahora mismo o busque atención médica inmediata si: 
? · Siente que bernardo bebé ha dejado de moverse o se mueve mucho menos de lo normal. ?Preste especial atención a los cambios en bernardo cecilio y asegúrese de comunicarse con bernardo médico si tiene cualquier problema. Dónde puede encontrar más información en inglés? Maciej Swift a http://tila-shalom.info/. Kamryn Dawkins L137 en la búsqueda para aprender más acerca de \"Conteo de las patadas de bernardo bebé: Instrucciones de cuidado - [ Counting Your Baby's Kicks: Care Instructions ]. \" 
Revisado: 16 marzo, 2017 Versión del contenido: 11.4 © 5454-2657 Healthwise, Incorporated. Las instrucciones de cuidado fueron adaptadas bajo licencia por [employer] (which disclaims liability or warranty for this information). Si usted tiene [place] afección médica o sobre estas instrucciones, siempre pregunte a bernardo profesional de cecilio. Healthwise, Incorporated niega toda garantía o responsabilidad por bernardo uso de esta información. Hamilton Gess - [ Learning About Pregnancy ] Instrucciones de cuidado Bernardo cecilio zoraida las primeras semanas del [place] es de particular importancia para la cecilio de bernardo bebé. Cuídese. Cualquier cosa que perjudique bernardo organismo puede perjudicar a bernardo bebé. Asegúrese de asistir a todas lucita citas médicas. Los chequeos médicos regulares les ayudarán a usted y a bernardo bebé a mantenerse saludables.  
Mukul Rhymes de seguimiento es maximo parte clave de bernardo tratamiento y seguridad. Asegúrese de hacer y acudir a todas las citas, y llame a luevano médico si está teniendo problemas. También es maximo buena idea saber los resultados de los exámenes y mantener maximo lista de los medicamentos que blanca. Cómo puede cuidarse en el hogar? ? Dieta ? · Siga maximo dieta balanceada. Asegúrese de incluir en luevano dieta abundantes frijoles (habichuelas), arvejas (chícharos) y verduras de hojas verdes. ? · No se saltee las comidas ni pase muchas horas sin comer. Si tiene náuseas, trate de comer un refrigerio pequeño y saludable cada 2 a 3 horas. ? · No consuma pescados que tengan [address] de jared, kenyatta tiburón, pez arthur o [place]. No coma más de maximo abraham de atún a la semana. ? · John abundantes líquidos, suficientes para que luevano orina sea de color amarillo larry o transparente kenyatta el agua. Si tiene [employer], el corazón o el hígado y tiene que Nathan's líquidos, hable con luevano médico antes de aumentar luevano consumo. ? · Reduzca la cafeína, kenyatta el café, el té y las bebidas de cola. ? · No john alcohol, kenyatta cerveza, vino o licor el. ? · [place] un multivitamínico que contenga al menos 400 microgramos (mcg) de ácido fólico para ayudar a prevenir los defectos congénitos (de nacimiento). El cereal enriquecido y el kauffman integral son buenas anthony adicionales de ácido fólico.  
? · Aumente el calcio en luevano Shivamving April. Trate de beber un cuarto de galón de [place]. También podría rinuk suplementos de calcio y elegir alimentos kenyatta el queso y el yogur. ? [address] ? · Asegúrese de asistir a las citas de seguimiento. ? · Descanse lo suficiente. Mientras esté embarazada podría sentirse más cansada de lo normal.  
? · Dasha por lo menos 30 minutos de ejercicio la mayoría de los días de la [place]. Caminar es maximo buena opción. Si no ha hecho ejercicio en el pasado, comience poco a poco. Dasha varias caminatas cortas todos los días. ? · No fume. Si necesita ayuda para dejar de fumar, hable con luevano médico sobre los programas para dejar de fumar. Éstos pueden aumentar lucita probabilidades de dejar el hábito para siempre. ? · No toque heces de gianna ni luevano caja de excrementos. Además, lávese las yonathan luego de manipular carne cruda y cocine nataliia toda la carne antes de comerla. Use guantes cuando trabaje en el jardín y [employer] yonathan cuando termine. Las heces de [place], la carne cruda o poco cocida, y la dre contaminada pueden causar infecciones que podrían perjudicar a luevano bebé o conducir a un aborto espontáneo. ? · No use \"jacuzzis\" ni saunas. Elevar la temperatura corporal podría perjudicar a luevano bebé. ? · Evite los gases de las sustancias químicas y la pintura, o los venenos. ? · No use drogas ilegales ni john alcohol. Medicamentos ? · Revise todos los medicamentos que esté tomando con luevano Michael Son sea necesario cambiar algunos de lucita medicamentos de rutina para proteger al bebé. ? · Gates acetaminofén (Tylenol) para [facility], kenyatta dolor de [place] o de espalda leves o fiebre leve con síntomas de resfriado. No utilice medicamentos antiinflamatorios no esteroideos (CÉSAR), tales kenyatta ibuprofeno (Advil, Motrin) o naproxeno (Aleve), a menos que luevano médico lo autorice. ? · No tome dos o más analgésicos (medicamentos para el dolor) al [employer] tiempo a menos que el médico se lo haya indicado. Muchos analgésicos contienen acetaminofén, es decir, Tylenol. El exceso de acetaminofén (Tylenol) puede ser dañino. ? · Vimessa exactamente kenyatta le fueron recetados. Llame a luevano médico si eduard estar teniendo problemas con luevano medicamento. [address] ? · Si siente náuseas al levantarse, pruebe rinku un refrigerio pequeño (kenyatta galletas saladas) antes de salir de la cama. Dese algún tiempo para digerir el refrigerio y salga de la cama lentamente. ? · No se saltee las comidas ni pase mucho tiempo sin comer. Un estómago [address] náuseas. ? · Consuma comidas pequeñas y frecuentes en lugar de nat comidas abundantes al día. ? · [place] abundantes líquidos. Las bebidas deportivas, kenyatta Gatorade o Kevin, son buenas opciones. ? · Consuma alimentos ricos en proteínas mikayla bajos en grasas. ? · Si está tomando suplementos de viviane, pregúntele a luevano médico si son necesarios. El viviane puede [employer]. ? · Evite cualquier [employer] produzca náuseas, kenyatta el del café. ? · Descanse mucho. Las náuseas del embarazo podrían empeorar si está cansada. Dónde puede encontrar más información en inglés? Abhi Goldman a http://tila-shalom.info/. Rachel Ruth A177 en la búsqueda para aprender más acerca de \"Aprenda sobre el [place] - [ Mar [place] About Pregnancy ]. \" 
Revisado: 16 marzo, 2017 Versión del contenido: 11.4 © 2546-9461 Healthwise, Incorporated. Las instrucciones de cuidado fueron adaptadas bajo licencia por [employer] (which disclaims liability or warranty for this information). Si usted tiene [place] afección médica o sobre estas instrucciones, siempre pregunte a luevano profesional de cecilio. Healthwise, Incorporated niega toda garantía o responsabilidad por luevano uso de esta información. Chart Review Routing History No Routing History on File

## 2018-05-20 NOTE — PROGRESS NOTES
Pt given instructions in Japanese. Previously checked that pt could read. Stable at time of discharge.

## 2018-05-20 NOTE — PROGRESS NOTES
spoke to pt on interprator phone. Pt was unaware that her Dr Was moving. Assured pt that I would have a person review her chart and find out what the plan will be. Face sheet with questions for Glenn Lynn  to resolve issue.

## 2018-05-20 NOTE — ED PROVIDER NOTES
Chief Complaint:      32 y.o. female at 35w5d  weeks gestation who is seen for decreased fetal movement. Pregnancy uncomplicated thus far. Feeling some movement now. HISTORY:    History   Sexual Activity    Sexual activity: Yes    Partners: Male     Patient's last menstrual period was 2017. Social History     Social History    Marital status: SINGLE     Spouse name: N/A    Number of children: N/A    Years of education: N/A     Occupational History    Not on file. Social History Main Topics    Smoking status: Never Smoker    Smokeless tobacco: Never Used    Alcohol use No    Drug use: No    Sexual activity: Yes     Partners: Male     Other Topics Concern    Not on file     Social History Narrative       Past Surgical History:   Procedure Laterality Date     DELIVERY ONLY         No past medical history on file. ROS:  A 12 point review of symptoms negative except for chief complaint as described above. PHYSICAL EXAM:  Weight 71.2 kg (157 lb), last menstrual period 2017. Constitutional: The patient appears well, alert, oriented x 3. Cardiovascular: Heart RRR, no murmurs. Respiratory: Lungs clear, no respiratory distress  GI: Abdomen soft, nontender, no guarding  No fundal tenderness  Musculoskeletal: no cva tenderness  Upper ext: no edema, reflexes +2  Lower ext: no edema, neg paulo's, reflexes +2  Skin: no rashes or lesions  Psychiatric:Mood/ Affect: appropriate  Genitourinary: SVE: deferred  FHT: 140's multiple accels, reactive NST  TOCO: rare contractions    I personally reviewed pt's medical record including relevant labs and ultrasounds    Assessment/Plan: decreased fetal movement w reassuring testing, reactive NST. Reassured, discussed movement counts. Stable for home, follow up in office as scheduled.

## 2018-05-30 ENCOUNTER — HOSPITAL ENCOUNTER (EMERGENCY)
Age: 28
Discharge: HOME | End: 2018-05-30
Attending: OBSTETRICS & GYNECOLOGY | Admitting: OBSTETRICS & GYNECOLOGY
Payer: MEDICAID

## 2018-05-30 VITALS
HEART RATE: 93 BPM | DIASTOLIC BLOOD PRESSURE: 75 MMHG | RESPIRATION RATE: 18 BRPM | TEMPERATURE: 98.2 F | SYSTOLIC BLOOD PRESSURE: 112 MMHG

## 2018-05-30 PROBLEM — R10.9 ABDOMINAL PAIN DURING PREGNANCY IN THIRD TRIMESTER: Status: ACTIVE | Noted: 2018-05-30

## 2018-05-30 PROBLEM — O26.893 ABDOMINAL PAIN DURING PREGNANCY IN THIRD TRIMESTER: Status: ACTIVE | Noted: 2018-05-30

## 2018-05-30 LAB
A1 MICROGLOB PLACENTAL VAG QL: NEGATIVE
CONTROL LINE PRESENT?: NORMAL
EXPIRATION DATE: NORMAL
GLUCOSE, GLUUPC: NEGATIVE
INTERNAL NEGATIVE CONTROL: NORMAL
KETONES UR-MCNC: NEGATIVE MG/DL
KIT LOT NO.: NORMAL
PROT UR QL: NEGATIVE

## 2018-05-30 PROCEDURE — 59025 FETAL NON-STRESS TEST: CPT

## 2018-05-30 PROCEDURE — 84112 EVAL AMNIOTIC FLUID PROTEIN: CPT | Performed by: OBSTETRICS & GYNECOLOGY

## 2018-05-30 PROCEDURE — 81002 URINALYSIS NONAUTO W/O SCOPE: CPT | Performed by: OBSTETRICS & GYNECOLOGY

## 2018-05-30 PROCEDURE — 99282 EMERGENCY DEPT VISIT SF MDM: CPT

## 2018-05-30 NOTE — PROGRESS NOTES
Patient presents to [facility] with complaint of contractions every 30min since yesterday.  Patient states that at 2pm she had a small amout of watery discharge and again at 6pm.

## 2018-05-30 NOTE — IP AVS SNAPSHOT
303 [facility] 
 
 
 [address] 
[phone] Patient: Selina Allan MRN: [id number] ZFU: About your hospitalization You were admitted on:  N/A You last received care in the:  SFE 4 DARLENE You were discharged on:  May 30, 2018 Why you were hospitalized Your primary diagnosis was:  Not on File Your diagnoses also included:  Abdominal Pain During Pregnancy In Third Trimester Follow-up Information Follow up With Details Comments Contact Info Agueda Guerra MD  at next appointment [address] [address] 
[phone] Your Scheduled Appointments 2018  3:20 PM EDT  
OB VISIT with Agueda Guerra MD  
[facility]) [address]  [address]  
[phone] 2018 12:30 PM EDT  
 with SFE OB OR  
SFE 4 L&D PROCEDURE ([address] [place] [address]) [facility] [address]  
[phone] 2018  SECTION with MD GRISELDA ZaragozaE 4 LABOR & DELIVERY (--)  
 [address]  
[phone] Discharge Orders None A check manan indicates which time of day the medication should be taken. My Medications ASK your doctor about these medications Instructions Each Dose to Equal  
 Morning Noon Evening Bedtime  
 ferrous sulfate 325 mg (65 mg iron) EC tablet Commonly known as:  IRON Your last dose was: Your next dose is: Take 1 Tab by mouth daily. Indications: Iron Deficiency Anemia 325 mg  
    
   
   
   
  
 prenatal vit-iron fumarate-fa 27 mg iron- 0.8 mg Tab tablet Your last dose was: Your next dose is: Take 1 Tab by mouth daily. Indications: pregnancy 1 Tab Discharge Instructions Precauciones en el embarazo: Instrucciones de cuidado - [ Pregnancy Precautions: Care Instructions ] Instrucciones de cuidado No hay maximo manera lai de prevenir el trabajo de parto antes de la fecha esperada (trabajo de parto prematuro) o de prevenir la mayoría de otros problemas en el [facility]. Sabina hay cosas que puede hacer para aumentar las probabilidades de tener un embarazo saludable. Vaya a lucita citas, siga los consejos de luevano médico y cuídese. Coma nataliia y bernie ejercicio (si luevano médico lo permite). Y asegúrese de rinku abundante agua. La atención de seguimiento es maximo parte clave de luevano tratamiento y seguridad. Asegúrese de hacer y acudir a todas las citas, y llame a luevano médico si está teniendo problemas. También es maximo buena idea saber los resultados de los exámenes y mantener maixmo lista de los medicamentos que blanca. Cómo puede cuidarse en el hogar? · Asegúrese de asistir a las citas prenatales. Luevano médico le tomará la presión arterial en cada consulta. Luevano médico también comprobará si tiene proteínas en luevano orina. Tanto la presión arterial sara kenyatta la presencia de proteínas en la orina son señales de preeclampsia. Esta afección puede ser peligrosa tanto para usted kenyatta para luevano bebé. · Samia abundantes líquidos, suficientes para que luevano orina sea de color amarillo larry o transparente kenyatta el agua. La deshidratación puede causar contracciones. Si tiene [employer], el corazón o el hígado y tiene que [place] líquidos, hable con luevano médico antes de aumentar luevano consumo. · Notifique a luevano médico de inmediato si presenta cualquier síntoma de infección, tales kenyatta: ¨ Ardor cuando orina. ¨ Flujo con mal olor de la vagina. ¨ Comezón en la vagina. ¨ Fiebre sin explicación. ¨ Dolor o sensibilidad inusual en el útero o la parte baja del abdomen. · Aliméntese en forma equilibrada. Incluya muchos alimentos que edward ricos en calcio y viviane. ¨ Entre los alimentos ricos en calcio se incluyen la [place], el queso, el yogur, Jeanann Chafe y el brócoli. ¨ Entre los alimentos ricos en viviane se incluyen las porsha martinez, los [place], las aves, los [place], los frijoles, las uvas pasas, el pan de grano integral y las verduras de hojas verdes. · No fume. Si necesita ayuda para dejar de fumar, hable con luevano médico sobre programas y medicamentos para dejar de fumar. Estos pueden aumentar lucita probabilidades de dejar el hábito para siempre. · No john alcohol ni use drogas ilegales. · Siga las instrucciones de luevano médico acerca de la [place]. Luevano médico le dirá cuánto ejercicio puede hacer. · Pregúntele a luevano médico si puede tener [employer]. Si usted está en riesgo de tener trabajo de [place], luevano médico podría pedirle que no tenga relaciones sexuales. · [place] precauciones para prevenir las caídas. Olman el embarazo las articulaciones están más sueltas y se tiene menos equilibrio. Los deportes tales kenyatta el ciclismo, el esquí o el patinaje en línea pueden aumentar el riesgo de caídas. Y no monte a marcia, norma en motocicleta, bernie clavados, bernie esquí acuático, bucee, ni salte en paracaídas mientras está embarazada. · Evite calentarse demasiado. No use saunas ni bañeras de hidromasaje. Evite la exposición al sol en climas calientes por mucho tiempo. [place] acetaminofén (Tylenol) para bajar maximo fiebre sara. · No tome medicamentos de venta ziyad, productos herbarios ni suplementos sin hablar michelle con luevano médico o farmacéutico. 

Cuándo debe pedir ayuda? Llame al 911 en cualquier momento que considere que necesita atención de [place]. Por ejemplo, llame si: 
? · Se desmayó (perdió el conocimiento). ? · Tiene sangrado vaginal intenso. ? · Tiene dolor intenso en el vientre o la pelvis. ? · Le sale abundante líquido o gotea de la vagina y sabe o eduard que el cordón umbilical se está saliendo a luevano vagina.  Si esto sucede, arrodíllese de inmediato, de masood forma que lucita nalgas estén más altas que luevano danilo. [place] disminuirá la presión sobre el cordón umbilical hasta que llegue la [facility]. ?Llame a luevano médico ahora mismo o busque atención médica inmediata si: 
? · Tiene señales de preeclampsia, tales kenyatta: ¨ Se le hinchan de manera repentina la lesly, las yonathan o los pies. ¨ Problemas nuevos con la visión (kenyatta oscurecimiento de la visión o visión borrosa). ¨ Dolor de danilo intenso. ? · Tiene cualquier sangrado vaginal.  
? · Tiene dolor abdominal o cólicos. ? · Tiene fiebre. ? · Ha tenido contracciones regulares (con o sin dolor) por Wynelle Munda. [place] significa que tiene 8 o más contracciones en 1 hora o que tiene 4 contracciones o más en 20 minutos después de [place] de posición y rinku líquidos. ? · Tiene maximo pérdida repentina de líquido por la vagina. ? · Tiene dolor en la parte baja de la espalda o presión en la pelvis que no desaparece. ? · Nota que luevano bebé ha dejado de moverse o se mueve mucho menos de lo normal. ?Preste especial atención a los cambios en luevano cecilio y asegúrese de comunicarse con luevano médico si tiene algún problema. Dónde puede encontrar más información en inglés? Wilma Ramos a [email or web address] Criss Eller V528 en la búsqueda para aprender más acerca de \"Precauciones en el embarazo: Instrucciones de cuidado - [ Pregnancy Precautions: Care Instructions ]. \" 
Revisado: 16 marzo, 2017 Versión del contenido: 11.4 © 5393-5655 Healthwise, Incorporated. Las instrucciones de cuidado fueron adaptadas bajo licencia por Good [employer] Connections (which disclaims liability or warranty for this information). Si usted tiene [place] afección médica o sobre estas instrucciones, siempre pregunte a luevano profesional de cecilio. [facility] niega toda garantía o responsabilidad por luevano uso de esta información. Introducing [facility] & HEALTH SERVICES! [facility] introduce portal paciente MyChart . Ahora se puede acceder a partes de luevano expediente médico, enviar por correo electrónico la oficina de luevano médico y solicitar renovaciones de medicamentos en línea. En luevano navegador de Internet , Reinaldo Walters a [email or web address] Bernie clic en el usuario por [place] Fillers? Rosemary Comes clic aquí en la sesión Carisa Roque. Verá la página de registro [place]. Ingrese luevano código de [employer] masood y kenyatta aparece a continuación. Usted no tendrá que [employer] código después de brent completado el proceso de registro . Si usted no se inscribe antes de la fecha de caducidad , debe solicitar un nuevo código. · MyChart Código de acceso : [id number] Expires: 7/3/2018  4:45 PM 
 
Ingresa los últimos cuatro dígitos de luevano Número de Seguro Social ( xxxx ) y fecha de nacimiento ( dd / mm / aaaa ) kenyatta se indica y bernie clic en Enviar. Usted será llevado a la siguiente página de registro . Crear un ID MyChart . Esta será luevano ID de inicio de sesión de MyChart y no puede ser [place] , por lo que pensar en maximo que es Melisa Fan y fácil de recordar . Crear maximo contraseña MyChart . Usted puede cambiar luevano contraseña en cualquier momento . Ingrese luevano Password Reset de preguntas y Lau . [place] se puede utilizar en un momento posterior si usted olvida luevano contraseña. Introduzca luevano dirección de correo electrónico . [place] recibirá maximo notificación por correo electrónico cuando la nueva información está disponible en MyChart . Arby [place] clic en Registrarse. Inocencia [place] felisa y descargar porciones de luevano expediente médico. 
Bernie janeth en el enlace de descarga del menú Resumen para descargar maximo copia portátil de luevano información médica . Si tiene [employer] , por favor visite la sección de preguntas frecuentes del sitio web MyChart . Recuerde, MyChart NO es que se utilizará para las necesidades urgentes. Para emergencias médicas , llame al 911 . Maameora disponible en luevano iPhone y Android ! Introducing Abhi Martínez As a [employer] patient, I wanted to make you aware of our electronic visit tool called Abhi Martínez. [employer] 24/7 allows you to connect within minutes with a medical provider 24 hours a day, seven days a week via a mobile device or tablet or logging into a secure website from your computer. You can access Abhi Martínez from anywhere in the [place]. A virtual visit might be right for you when you have a simple condition and feel like you just dont want to get out of bed, or cant get away from work for an appointment, when your regular [facility] provider is not available (evenings, weekends or holidays), or when youre out of town and need minor care. Electronic visits cost only $49 and if the [employer] 24/7 provider determines a prescription is needed to treat your condition, one can be electronically transmitted to a nearby pharmacy*. Please take a moment to enroll today if you have not already done so. The enrollment process is free and takes just a few minutes. To enroll, please download the [facility] 24/7 diana to your tablet or phone, or visit [email or web address] to enroll on your computer. And, as an [address] [place] [address] patient with a [employer] account, the results of your visits will be scanned into your electronic medical record and your primary care provider will be able to view the scanned results. We urge you to continue to see your regular [facility] provider for your ongoing medical care. And while your primary care provider may not be the one available when you seek a Abhi Martínez virtual visit, the peace of mind you get from getting a real diagnosis real time can be priceless. For more information on Abhi Martínez, view our Frequently Asked Questions (FAQs) at [email or web address]. Sincerely, 
 
Laquita Agarwal MD 
Chief Medical Officer [employer] *:  certain medications cannot be prescribed via Abhi Martínez Providers Seen During Your Hospitalization Provider Specialty Primary office phone Shelley Wasserman MD Obstetrics & Gynecology [phone] Your Primary Care Physician (PCP) Primary Care Physician Office Phone Office Fax NONE ** None ** ** None ** You are allergic to the following No active allergies Recent Documentation OB Status Smoking Status Pregnant Never Smoker Emergency Contacts Name Discharge Info Relation Home Work Mobile Gustabo Yeh [17] [phone] Patient Belongings The following personal items are in your possession at time of discharge: 
                             
 
  
  
 Please provide this summary of care documentation to your next provider. Signatures-by signing, you are acknowledging that this After Visit Summary has been reviewed with you and you have received a copy. Patient Signature:  ____________________________________________________________ Date:  ____________________________________________________________  
  
Becca Quach Provider Signature:  ____________________________________________________________ Date:  ____________________________________________________________  
  
  
   
  
Belen Collado [address]  
[phone] Patient: Page Alberts MRN: [id number] KEC:[id number] Sobre bernardo hospitalización Le admitieron el:  N/A Bernardo tratamiento más reciente fue el:  SFE 4 DARLENE Le dieron de sara el: May 30, 2018 Por qué le ingresaron Bernardo diagnosis primaria es:  No está archivado/a Bernardo diagnosis también incluye:  Abdominal Pain During Pregnancy In Third Trimester Follow-up Information Follow up With Details Comments Contact Info Sari Lares MD  at next appointment [address]  [address] 
[phone] Your Scheduled Appointments 2018  3:20 PM EDT  
OB VISIT with Sari Lares MD  
[facility]) [address]  [address]  
[phone] 2018 12:30 PM EDT  
 with SFE OB OR  
SFE 4 L&D PROCEDURE ([facility]) [place] 149 [facility] [address]  
[phone] 2018  SECTION with Sari Lares MD  
SFE 4 LABOR & DELIVERY (--)  
 [facility] [address] [facility] [address]  
[phone] Discharge Orders NEXAGE A check manan indicates which time of day the medication should be taken. My Medications CONSULTE con luevano médico sobre Clerts! Instructions Each Dose to Equal  
 Morning Noon Evening Bedtime  
 ferrous sulfate 325 mg (65 mg iron) EC tablet También conocido kenyatta:  IRON Your last dose was: Your next dose is: Take 1 Tab by mouth daily. Indications: Iron Deficiency Anemia 325 mg  
    
   
   
   
  
 prenatal vit-iron fumarate-fa 27 mg iron- 0.8 mg Tab tablet Your last dose was: Your next dose is: Take 1 Tab by mouth daily. Indications: pregnancy 1 Tab Instrucciones a luis de sara Precauciones en el embarazo: Instrucciones de cuidado - [ Pregnancy Precautions: Care Instructions ] Instrucciones de cuidado No hay maximo manera lai de prevenir el trabajo de parto antes de la fecha esperada (trabajo de parto prematuro) o de prevenir la mayoría de otros problemas en el [facility]. Sab[place] hay cosas que puede hacer para aumentar las probabilidades de tener un embarazo saludable. Vaya a lucita citas, siga los consejos de luevano médico y cuídese.  Coma nataliia y bernie ejercicio (si luevano médico lo permite). Y asegúrese de rinku abundante agua. La atención de seguimiento es maximo parte clave de luevano tratamiento y seguridad. Asegúrese de hacer y acudir a todas las citas, y llame a luevano médico si está teniendo problemas. También es maximo buena idea saber los resultados de los exámenes y mantener maximo lista de los medicamentos que blanca. Cómo puede cuidarse en el hogar? · Asegúrese de asistir a las citas prenatales. Luevano médico le tomará la presión arterial en cada consulta. Luevano médico también comprobará si tiene proteínas en luevano orina. Tanto la presión arterial sara kenyatta la presencia de proteínas en la orina son señales de preeclampsia. Esta afección puede ser peligrosa tanto para usted kenyatta para luevano bebé. · Samia abundantes líquidos, suficientes para que luevano orina sea de color [place] larry o transparente kenyatta el agua. La deshidratación puede causar contracciones. Si tiene [employer] [facility] Financial, el corazón o el hígado y tiene que [place]'s líquidos, hable con luevano médico antes de aumentar luevano consumo. · Notifique a luevano médico de inmediato si presenta cualquier síntoma de infección, tales kenyatta: ¨ Ardor cuando orina. ¨ Flujo con mal olor de la vagina. ¨ Comezón en la vagina. ¨ Fiebre sin explicación. ¨ Dolor o sensibilidad inusual en el útero o la parte baja del abdomen. · Aliméntese en forma equilibrada. Incluya muchos alimentos que edward ricos en calcio y viviane. ¨ Entre los alimentos ricos en calcio se incluyen la [place], el queso, el yogur, Shelbie Cedillo y el brócoli. ¨ Entre los alimentos ricos en viviane se incluyen las porsha martinez, los [place], las aves, los [place], los frijoles, las uvas pasas, el pan de grano integral y las verduras de hojas verdes. · No fume. Si necesita ayuda para dejar de fumar, hable con luevano médico sobre programas y medicamentos para dejar de fumar. Estos pueden aumentar lucita probabilidades de dejar el hábito para siempre. · No samia alcohol ni use drogas ilegales. · Siga las instrucciones de luevano médico acerca de la [place]. Luevano médico le dirá cuánto ejercicio puede hacer. · Pregúntele a luevano médico si puede tener [employer]. Si usted está en riesgo de tener trabajo de [place], luevano médico podría pedirle que no tenga relaciones sexuales. · [place] precauciones para prevenir las caídas. Olman el embarazo las articulaciones están más sueltas y se tiene menos equilibrio. Los deportes tales kenyatta el ciclismo, el esquí o el patinaje en línea pueden aumentar el riesgo de caídas. Y no monte a marcia, norma en motocicleta, bernie clavados, bernie esquí acuático, bucee, ni salte en paracaídas mientras está embarazada. · Evite calentarse demasiado. No use saunas ni bañeras de hidromasaje. Evite la exposición al sol en climas calientes por mucho tiempo. [place] acetaminofén (Tylenol) para bajar maximo fiebre sara. · No tome medicamentos de venta ziyad, productos herbarios ni suplementos sin hablar michelle con luevano médico o farmacéutico. 

Cuándo debe pedir ayuda? Llame al 911 en cualquier momento que considere que necesita atención de [place]. Por ejemplo, llame si: 
? · Se desmayó (perdió el conocimiento). ? · Tiene sangrado vaginal intenso. ? · Tiene dolor intenso en el vientre o la pelvis. ? · Le sale abundante líquido o gotea de la vagina y sabe o eduard que el cordón umbilical se está saliendo a luevano vagina. Si esto sucede, arrodíllese de inmediato, de masood forma que lucita nalgas estén más altas que luevano danilo. [place] disminuirá la presión sobre el cordón umbilical hasta que llegue la [facility]. ?Llame a luevano médico ahora mismo o busque atención médica inmediata si: 
? · Tiene señales de preeclampsia, tales kenyatta: ¨ Se le hinchan de manera repentina la lesly, las yonathan o los pies. ¨ Problemas nuevos con la visión (kenyatta oscurecimiento de la visión o visión borrosa). ¨ Dolor de danilo intenso. ? · Tiene cualquier sangrado vaginal.  
? · Tiene dolor abdominal o cólicos. ? · Tiene fiebre. ? · Ha tenido contracciones regulares (con o sin dolor) por [place] Money. [place] significa que tiene 8 o más contracciones en 1 hora o que tiene 4 contracciones o más en 20 minutos después de [place] de posición y rinku líquidos. ? · Tiene maximo pérdida repentina de líquido por la vagina. ? · Tiene dolor en la parte baja de la espalda o presión en la pelvis que no desaparece. ? · Nota que luevano bebé ha dejado de moverse o se mueve mucho menos de lo normal. ?Preste especial atención a los cambios en luevano cecilio y asegúrese de comunicarse con luevano médico si tiene algún problema. Dónde puede encontrar más información en inglés? Jan Valentin a [email or web address] Mary Ellen RICHARDS en la búsqueda para aprender más acerca de \"Precauciones en el embarazo: Instrucciones de cuidado - [ Pregnancy Precautions: Care Instructions ]. \" 
Revisado: 16 marzo, 2017 Versión del contenido: 11.4 © 1618-0041 Healthwise, Incorporated. Las instrucciones de cuidado fueron adaptadas bajo licencia por Good Help Connections (which disclaims liability or warranty for this information). Si usted tiene [place] afección médica o sobre estas instrucciones, siempre pregunte a luevano profesional de cecilio. Healthwise, Incorporated niega toda garantía o responsabilidad por luevano uso de esta información. Introducing [facility] & HEALTH SERVICES! [facility] introduce portal paciente [place] . Ahora se puede acceder a partes de luevano expediente médico, enviar por correo electrónico la oficina de luevano médico y solicitar renovaciones de medicamentos en línea. En luevano navegador de Internet , Clefarhadeen Carmen a [email or web address] Dasha clic en el usuario por [place] [employer]? Celina See clic aquí en la sesión Joyce [place]. Verá la página de registro [place]. Ingrese luevano código de [facility] masood y kenyatta aparece a continuación.  Usted no tendrá que [employer] código después de brent completado el proceso de registro . Si usted no se inscribe antes de la fecha de caducidad , debe solicitar un nuevo código. · MyChart Código de acceso : [id number] Expires: 7/3/2018  4:45 PM 
 
Ingresa los últimos cuatro dígitos de luevano Número de Seguro Social ( xxxx ) y fecha de nacimiento ( dd / mm / aaaa ) kenyatta se indica y dasha janeth en Enviar. Usted será llevado a la siguiente página de registro . Crear un ID MyChart . Esta será luevano ID de inicio de sesión de MyChart y no puede ser [place] , por lo que pensar en maximo que es Leisa Appl y fácil de recordar . Crear maximo contraseña MyChart . Usted puede cambiar luevano contraseña en cualquier momento . Ingrese luevano Password Reset de preguntas y Lau . [place] se puede utilizar en un momento posterior si usted olvida luevano contraseña. Introduzca luevano dirección de correo electrónico . Rusty Officer recibirá maximo notificación por correo electrónico cuando la nueva información está disponible en MyChart . Canoocheeenrrique fowler en Registrarse. Anusha Lennone felisa y descargar porciones de luevano expediente médico. 
Dasha janeth en el enlace de descarga del menú Resumen para descargar maximo copia portátil de luevano información médica . Si tiene [employer] , por favor visite la sección de preguntas frecuentes del sitio web MyChart . Recuerde, MyChart NO es que se utilizará para las necesidades urgentes. Para emergencias médicas , llame al 911 . Ahora disponible en luevano iPhone y Android ! Introducing Abhi Martínez As a [facility] patient, I wanted to make you aware of our electronic visit tool called Abhi Martínez. [facility] 24/7 allows you to connect within minutes with a medical provider 24 hours a day, seven days a week via a mobile device or tablet or logging into a secure website from your computer. You can access Abhi Martínez from anywhere in the [place].  
 
A virtual visit might be right for you when you have a simple condition and feel like you just dont want to get out of bed, or cant get away from work for an appointment, when your regular [employer] provider is not available (evenings, weekends or holidays), or when youre out of town and need minor care. Electronic visits cost only $49 and if the [employer] 24/7 provider determines a prescription is needed to treat your condition, one can be electronically transmitted to a nearby pharmacy*. Please take a moment to enroll today if you have not already done so. The enrollment process is free and takes just a few minutes. To enroll, please download the [employer] 24/7 diana to your tablet or phone, or visit [email or web address] to enroll on your computer. And, as an [address] [place] [address] patient with a [employer] account, the results of your visits will be scanned into your electronic medical record and your primary care provider will be able to view the scanned results. We urge you to continue to see your regular [employer] provider for your ongoing medical care. And while your primary care provider may not be the one available when you seek a [employer] virtual visit, the peace of mind you get from getting a real diagnosis real time can be priceless. For more information on [employer], view our Frequently Asked Questions (FAQs) at [email or web address]. Sincerely, 
 
Indira García MD 
Chief Medical Officer [facility] Ramila Cedillo *:  certain medications cannot be prescribed via [employer] Providers Seen During Your Hospitalization Personal [facility] Teléfono principal de la oficina Erinn Oropeza MD Obstetrics & Gynecology [phone] Bernardo médico de atención primaria (PCP ) Primary Care Physician Office Phone Office Fax NONE ** None ** ** None ** Tiene alergias a lo siguiente No tiene alergias Documentación recientes Estado obstétrico Estatus de tabaquísmo Pregnant Never Smoker Emergency Contacts Name Discharge Info Relation Home Work Mobile Gustabo Yeh [[phone] Patient Belongings The following personal items are in your possession at time of discharge: 
                             
 
  
  
 Please provide this summary of care documentation to your next provider. Signatures-by signing, you are acknowledging that this After Visit Summary has been reviewed with you and you have received a copy. Patient Signature:  ____________________________________________________________ Date:  ____________________________________________________________  
  
[facility] Signature:  ____________________________________________________________ Date:  ____________________________________________________________

## 2018-05-31 NOTE — PROGRESS NOTES
Patient discharged home with interrater present and patient verbalizes understanding of discharge instructions.  Patient ambulated out with family

## 2018-05-31 NOTE — DISCHARGE INSTRUCTIONS
Precauciones en el embarazo: Instrucciones de cuidado - [ Pregnancy Precautions: Care Instructions ]  Instrucciones de cuidado    No hay maximo manera lai de prevenir el trabajo de parto antes de la fecha esperada (trabajo de parto prematuro) o de prevenir la mayoría de otros problemas en el [facility]. Sabina hay cosas que puede hacer para aumentar las probabilidades de tener un embarazo saludable. Vaya a lucita citas, siga los consejos de luevano médico y cuídese. Coma nataliia y bernie ejercicio (si luevano médico lo permite). Y asegúrese de rinku abundante agua. La atención de seguimiento es maximo parte clave de luevano tratamiento y seguridad. Asegúrese de hacer y acudir a todas las citas, y llame a luevano médico si está teniendo problemas. También es maximo buena idea saber los resultados de los exámenes y mantener maximo lista de los medicamentos que blanca. ¿Cómo puede cuidarse en el hogar? · Asegúrese de asistir a las citas prenatales. Luevano médico le tomará la presión arterial en cada consulta. Luevano médico también comprobará si tiene proteínas en luevano orina. Tanto la presión arterial sara kenyatta la presencia de proteínas en la orina son señales de preeclampsia. Esta afección puede ser peligrosa tanto para usted kenyatta para luevano bebé. · Samia abundantes líquidos, suficientes para que luevano orina sea de color amarillo larry o transparente kenyatta el agua. La deshidratación puede causar contracciones. Si tiene [employer], el corazón o el hígado y tiene que Nathan's líquidos, hable con luevano médico antes de aumentar luevano consumo. · Notifique a luevano médico de inmediato si presenta cualquier síntoma de infección, tales kenyatta:  ¨ Ardor cuando orina. ¨ Flujo con mal olor de la vagina. ¨ Comezón en la vagina. ¨ Fiebre sin explicación. ¨ Dolor o sensibilidad inusual en el útero o la parte baja del abdomen. · Aliméntese en forma equilibrada. Incluya muchos alimentos que edward ricos en calcio y viviane.   ¨ Entre los alimentos ricos en calcio se [address], el yogur, [place] y el bróco. ¨ Entre los alimentos ricos en viviane se incluyen las porsha martinez, los [place], las aves, los [place], los frijoles, las uvas pasas, el pan de grano integral y las verduras de hojas verdes. · No fume. Si necesita ayuda para dejar de fumar, hable con luevano médico sobre programas y medicamentos para dejar de fumar. Estos pueden aumentar lucita probabilidades de dejar el hábito para siempre. · No john alcohol ni use drogas ilegales. · Siga las instrucciones de luevano médico acerca de la [place]. Luevano médico le dirá cuánto ejercicio puede hacer. · Pregúntele a luevano médico si puede tener [employer]. Si usted está en riesgo de tener trabajo de [place], luevano médico podría pedirle que no tenga relaciones sexuales. · [place] precauciones para prevenir las caídas. Olman el embarazo las articulaciones están más sueltas y se tiene menos equilibrio. Los deportes tales kenyatta el ciclismo, el esquí o el patinaje en línea pueden aumentar el riesgo de caídas. Y no monte a marcia, norma en motocicleta, bernie clavados, bernie esquí acuático, bucee, ni salte en paracaídas mientras está embarazada. · Evite calentarse demasiado. No use saunas ni bañeras de hidromasaje. Evite la exposición al sol en climas calientes por mucho tiempo. [place] acetaminofén (Tylenol) para bajar maximo fiebre sara. · No tome medicamentos de venta ziyad, productos herbarios ni suplementos sin hablar michelle con luevano médico o farmacéutico.  ¿Cuándo debe pedir ayuda? Llame al 911 en cualquier momento que considere que necesita atención de [place]. Por ejemplo, llame si:  ? · Se desmayó (perdió el conocimiento). ? · Tiene sangrado vaginal intenso. ? · Tiene dolor intenso en el vientre o la pelvis. ? · Le sale abundante líquido o gotea de la vagina y sabe o eduard que el cordón umbilical se está saliendo a luevano vagina.  Si esto sucede, arrodíllese de inmediato, de masood forma que lucita nalgas estén más altas que luevano danilo. [place] disminuirá la presión sobre el cordón umbilical hasta que llegue la [place]. ?Llame a luevano médico ahora mismo o busque atención médica inmediata si:  ? · Tiene señales de preeclampsia, tales kenyatta:  ¨ Se le hinchan de manera repentina la lesly, las yonathan o los pies. ¨ Problemas nuevos con la visión (kenyatta oscurecimiento de la visión o visión borrosa). ¨ Dolor de danilo intenso. ? · Tiene cualquier sangrado vaginal.   ? · Tiene dolor abdominal o cólicos. ? · Tiene fiebre. ? · Ha tenido contracciones regulares (con o sin dolor) por [employer]. [place] significa que tiene 8 o más contracciones en 1 hora o que tiene 4 contracciones o más en 20 minutos después de [place] de posición y rinku líquidos. ? · Tiene maximo pérdida repentina de líquido por la vagina. ? · Tiene dolor en la parte baja de la espalda o presión en la pelvis que no desaparece. ? · Nota que luevano bebé ha dejado de moverse o se mueve mucho menos de lo normal.   ?Preste especial atención a los cambios en luevano cecilio y asegúrese de comunicarse con luevano médico si tiene algún problema. ¿Dónde puede encontrar más información en inglés? Wilma Ramos a http://tila-shalom.info/. Criss Eller U602 en la búsqueda para aprender más acerca de \"Precauciones en el embarazo: Instrucciones de cuidado - [ Pregnancy Precautions: Care Instructions ]. \"  Revisado: 16 marzo, 2017  Versión del contenido: 11.4  © 6486-6988 Healthwise, Incorporated. Las instrucciones de cuidado fueron adaptadas bajo licencia por [employer] (which disclaims liability or warranty for this information). Si usted tiene [place] afección médica o sobre estas instrucciones, siempre pregunte a luevano profesional de cecilio. [facility] niega toda garantía o responsabilidad por luevano uso de esta información.

## 2018-05-31 NOTE — H&P
CC  Chief Complaint   Patient presents with    Contractions     37w1d    Rupture of Membranes       History:    32 y.o. female at 37w1d weeks gestation who requesting evaluation for Uterine contractions    Fetal movement has been normal  HISTORY:  OB History    Para Term  AB Living   2 1 1   1   SAB TAB Ectopic Molar Multiple Live Births        1      # Outcome Date GA Lbr Jason/2nd Weight Sex Delivery Anes PTL Lv   2 Current            1 Term    3.771 kg M CS-Unspec EPIDURAL AN  RIZWANA          History   Sexual Activity    Sexual activity: Yes    Partners: Male     Patient's last menstrual period was 2017. Social History     Social History    Marital status: SINGLE     Spouse name: N/A    Number of children: N/A    Years of education: N/A     Occupational History    Not on file. Social History Main Topics    Smoking status: Never Smoker    Smokeless tobacco: Never Used    Alcohol use No    Drug use: No    Sexual activity: Yes     Partners: Male     Other Topics Concern    Not on file     Social History Narrative       Past Surgical History:   Procedure Laterality Date     DELIVERY ONLY         Past Medical History:   Diagnosis Date    Anemia          ROS:  Negative:  headache , nausea and vomiting, vaginal bleeding  and visual disturbances. Positive:  contractions. PHYSICAL EXAM:  Blood pressure 112/75, pulse 93, temperature 98.2 °F (36.8 °C), resp. rate 18, last menstrual period 2017. General: well developed and well nourished  Resp:  breath sounds clear and equal bilaterally  Card:  RRR, no MRG  Abd: WNL.      Uterine contractions: irregular, every 10-15 minutes    Fetal Assessment: Baseline FHR: 145 per minute     Fetal heart variability: moderate     Fetal Heart Rate decelerations: none     Fetal Heart Rate accelerations: yes     Prestentation: vertex by exam,    Pelvic:   External- normal EGBSU w/o lesions     SVE- Cervical Exam: 0 cm dilated 0% effaced    -1 station , cervix very posterior     Ext: edema, clonus and DTR's normal    Assessment:  32 y.o. female at 37w1d weeks gestation  Reassuring fetal status  Not in labor.         Plan:  Discharge home with routine labor instructions and warning signs, Keep follow up appointment with regular provider as scheduled, Instructed in fetal activity monitoring    Sebastien Borrero MD

## 2018-06-01 ENCOUNTER — HOSPITAL ENCOUNTER (EMERGENCY)
Age: 28
Discharge: HOME | End: 2018-06-01
Attending: EMERGENCY MEDICINE
Payer: MEDICAID

## 2018-06-01 VITALS
BODY MASS INDEX: 28.88 KG/M2 | SYSTOLIC BLOOD PRESSURE: 92 MMHG | DIASTOLIC BLOOD PRESSURE: 64 MMHG | HEIGHT: 63 IN | WEIGHT: 163 LBS | TEMPERATURE: 98.1 F | HEART RATE: 66 BPM | RESPIRATION RATE: 16 BRPM | OXYGEN SATURATION: 98 %

## 2018-06-01 DIAGNOSIS — G44.209 TENSION HEADACHE: Primary | ICD-10-CM

## 2018-06-01 DIAGNOSIS — Z34.93 THIRD TRIMESTER PREGNANCY: ICD-10-CM

## 2018-06-01 PROCEDURE — 99283 EMERGENCY DEPT VISIT LOW MDM: CPT | Performed by: EMERGENCY MEDICINE

## 2018-06-01 RX ORDER — BUTALBITAL, ACETAMINOPHEN AND CAFFEINE 300; 40; 50 MG/1; MG/1; MG/1
1-2 CAPSULE ORAL
Qty: 20 CAP | Refills: 0 | Status: SHIPPED | OUTPATIENT
Start: 2018-06-01 | End: 2018-06-06

## 2018-06-01 RX ORDER — PROCHLORPERAZINE MALEATE 10 MG
10 TABLET ORAL
Qty: 8 TAB | Refills: 1 | Status: SHIPPED | OUTPATIENT
Start: 2018-06-01 | End: 2018-06-06

## 2018-06-01 NOTE — DISCHARGE INSTRUCTIONS
Dolor de danilo: Instrucciones de cuidado - [ Headache: Care Instructions ]  Instrucciones de cuidado    Los wilfred de danilo tienen muchas causas posibles. La mayoría de los wilfred de danilo no son señal de un problema más ebenezer y mejoran por sí solos. El tratamiento en el hogar podría ayudarlo a sentirse mejor con Ai Mehta. El médico lo goodwin revisado minuciosamente, mikayla puede desarrollar problemas más tarde. Si nota algún problema o síntomas, busque tratamiento médico inmediatamente. La atención de seguimiento es maximo parte clave de luevano tratamiento y seguridad. Asegúrese de hacer y acudir a todas las citas, y llame a luevano médico si está teniendo problemas. También es maximo buena idea saber los resultados de los exámenes y mantener maximo lista de los medicamentos que blanca. ¿Cómo puede cuidarse en el [facility]? · No conduzca si ha tomado analgésicos (medicamentos para el dolor) recetados. · Descanse en un cuarto tranquilo y oscuro hasta que desaparezca el dolor de [place]. Cierre los ojos y trate de relajarse o dormirse. No aida la televisión ni nghia. · Colóquese un paño frío y húmedo o Francine Pour compresa fría sobre la marito adolorida de 10 a 21 minutos cada vez. Póngase un paño rubio entre la compresa fría y la piel. · Utilice maximo toalla húmeda tibia o maximo almohadilla térmica ajustada a baja temperatura para relajar los músculos tensos del jacque y los hombros.  · [place] a alguien que le bernie masajes suaves en el jacque y los hombros.  · [place] los analgésicos exactamente kenyatta le fueron indicados. ¨ Si el médico le recetó un analgésico, tómelo según las indicaciones. ¨ Si no está tomando un analgésico recetado, pregúntele a luevano médico si puede rinku susie de [employer]. · Tenga cuidado de no rinku analgésicos con mayor frecuencia que la permitida en las indicaciones porque los wilfred de danilo podrían empeorar o aparecer con mayor frecuencia maximo vez que el medicamento pierda luevano [place].   · Preste atención a los nuevos síntomas que aparecen con el dolor de [place], [place], debilidad o entumecimiento, cambios en la visión o confusión. Podrían ser señales de un problema más grave. Para prevenir los wilfred de danilo  · [employer] un diario de lucita wilfred de danilo para que pueda averiguar qué los desencadena. Evitar los desencadenantes podría ayudar a prevenir los wilfred de [place]. Anote cuándo empieza cada dolor de [place], cuánto dura y cómo es el dolor (palpitante, marisa, punzante o sordo). Anote cualquier otro síntoma que haya tenido con el dolor de [place], Monona náuseas, destellos de meme o UMAIR, o sensibilidad a la meme brillante o a los ruidos rod. Anote si el dolor de danilo ocurrió cerca de luevano menstruación. Enumere todos los factores que pudieran brent desencadenado el dolor de [place], kenyatta ciertos alimentos (chocolate, queso, vino) u olores, humo, luces brillantes, estrés o falta de sueño. · Encuentre maneras saludables de [facility]. Los wilfred de [place] son más comunes zoraida o traci después de un momento estresante. Tómese un tiempo para relajarse antes y después de hacer algo que le haya causado un dolor de danilo en el pasado. · Trate de mantener lucita músculos relajados mediante maximo buena postura. Revise si tiene [employer] de la Khadijah, la lesly, el jacque y los hombros y trate de relajarlos. Cuando se siente en un escritorio, cambie de posición con frecuencia y estírese por 27 segundos cada hora. · Dasha suficiente ejercicio y duerma bastante. · Coma en forma regular y nataliia. Largos períodos sin comer pueden provocar un dolor de danilo. · Regálese un masaje. Algunas personas encuentran que los masajes hechos con regularidad son [place] para aliviar la tensión. · Limite la cafeína. No john demasiado café, té ni sodas. Sabina no deje de consumir cafeína de repente, porque eso también puede provocarle wilfred de [place].   · Reduzca la tensión en los ojos a causa de la computadora parpadeando con frecuencia y apartando la mirada de la pantalla a menudo. Asegúrese de tener lentes adecuados y de que luevano monitor esté colocado de manera correcta, kenyatta a un brazo de distancia. · Busque ayuda si tiene depresión o ansiedad. Viviana wilfred de [place] podrían relacionarse con estas afecciones. El tratamiento puede prevenir los wilfred de [place] y ayudar con los síntomas de ansiedad o depresión. ¿Cuándo debe pedir ayuda? Llame al 911 en cualquier momento que piense que puede necesitar atención de emergencia. Por ejemplo, llame si:  ? · Tiene señales de un ataque cerebral. Estas pueden incluir:  ¨ Parálisis, entumecimiento o debilidad repentinos en la lesly, el brazo o la pierna, sobre todo si ocurre en un solo lado del cuerpo. ¨ Cambios repentinos en la visión. ¨ Dificultades repentinas para hablar. ¨ Confusión repentina o dificultad para comprender frases sencillas. ¨ Problemas repentinos para caminar o mantener el equilibrio. ¨ Dolor de [place] intenso y repentino, distinto de los wilfred de danilo anteriores. ?Llame a luevano médico ahora mismo o busque atención médica inmediata si:  ? · Tiene un dolor de danilo nuevo o peor. ? · Luevano dolor de danilo Re[employer]ult. ¿Dónde puede encontrar más información en inglés? Bonny Torres a http://tila-shalom.info/. Escriba N246 en la búsqueda para aprender más acerca de \"Dolor de danilo: Instrucciones de cuidado - [ Headache: Care Instructions ]. \"  Revisado: 14 octubre, 2016  Versión del contenido: 11.4  © 2526-5814 Healthwise, [employer]. Las instrucciones de cuidado fueron adaptadas bajo licencia por [employer] (which disclaims liability or warranty for this information). Si usted tiene [place] afección médica o sobre estas instrucciones, siempre pregunte a luevano profesional de cecilio. Healthwise, Incorporated niega toda garantía o responsabilidad por luevano uso de esta información.          Dolor de danilo por tensión: Instrucciones de cuidado - [ Tension Headache: Care Instructions ]  Instrucciones de [address] de los wilfred de [place] son por tensión. Estos wilfred de [place] tienden a repetirse, en especial si usted está bajo estrés. Un dolor de danilo por tensión podría causar dolor o maximo sensación de presión en toda la danilo. Es probable que no pueda determinar con precisión el centro del dolor. Si sigue teniendo wilfred de danilo por tensión, lo mejor que puede hacer para limitarlos es determinar qué los causa y hacer cambios en esas áreas. La atención de seguimiento es maximo parte clave de luevano tratamiento y seguridad. Asegúrese de hacer y acudir a todas las citas, y llame a luevano médico si está teniendo problemas. También es maximo buena idea saber los resultados de los exámenes y mantener maximo lista de los medicamentos que blanca. ¿Cómo puede cuidarse en el hogar? · Descanse en un cuarto tranquilo y oscuro con un paño frío sobre la frente hasta que desaparezca el dolor de [place]. Cierre los ojos y trate de relajarse o dormirse. No aida televisión ni nghia. Evite usar la computadora. · Utilice maximo toalla húmeda tibia o maximo almohadilla térmica ajustada a baja temperatura para relajar los músculos rígidos del jacque y los hombros.  · Pídale a alguien que le bernie masajes suaves en el jacque y los hombros.  · [place] los analgésicos (medicamentos para el dolor) exactamente según las indicaciones. ¨ Si el médico le recetó un analgésico, tómelo según las indicaciones. ¨ Si no está tomando un analgésico recetado, pregúntele a luevano médico si puede rinku susie de [employer]. · Tenga cuidado de no rinku analgésicos con mayor frecuencia que la permitida en las indicaciones porque los wilfred de danilo podrían empeorar o aparecer con mayor frecuencia maximo vez que el medicamento pierda luevano [place]. · Si le da otro dolor de danilo por tensión, deje de hacer lo que esté haciendo y siéntese tranquilo zoraida un momento.  Cierre los ojos y respire lentamente. Trate de [employer] de la danilo y del jacque. · Preste atención a los nuevos síntomas que aparecen con el dolor de [place], [place], debilidad o entumecimiento, cambios en la visión o confusión. Podrían ser señales de un problema más grave. Para ayudar a prevenir los wilfred de danilo  · [employer] un diario de lucita wilfred de danilo para poder averiguar qué los produce. Evitar los desencadenantes podría ayudar a prevenir los wilfred de [place]. Anote cuándo empieza cada dolor de [place], cuánto dura y cómo es el dolor (palpitante, marisa, punzante o sordo). Ponga en la lista cualquier cosa que pudiera brent desencadenado el dolor de danilo, masood kenyatta estrés físico o emocional o estar ansioso o deprimido. Otros desencadenantes posibles son Irl Restrepo, la braulio, la fatiga, [place] y la distensión muscular. · Encuentre maneras saludables de [facility]. Los wilfred de [place] son más comunes zoraida o traci después de un momento estresante. Tómese un tiempo para relajarse antes y después de hacer algo que le haya causado un dolor de danilo en el pasado. · Dasha ejercicio a diario para aliviar el estrés. Hacer ejercicios de relajación podría ayudarle a reducir la tensión. · Duerma lo suficiente. · Coma con regularidad y nataliia. Largos períodos sin comer pueden provocar un dolor de danilo. · Regálese un masaje. Algunas personas encuentran que los masajes son Alejandra Miser para aliviar la tensión. · Trate de mantener lucita músculos relajados mediante maximo buena postura. Revise si tiene [employer] de la Khadijah, la lesly, el jacque y los hombros y trate de relajarlos. Cuando se siente en un escritorio, cambie de posición con frecuencia y estírese por 27 segundos cada hora. · Reduzca la fatiga ocular a causa de la computadora parpadeando con frecuencia y apartando la mirada de la pantalla a menudo.  Asegúrese de tener lentes adecuados y de que luevano monitor esté colocado de [employer], kenyatta a un brazo de distancia. ¿Cuándo debe pedir ayuda? Llame al 911 en cualquier momento que piense que puede necesitar atención de [place]. Por ejemplo, llame si:  ? · Tiene señales de un ataque cerebral. Estas pueden incluir:  ¨ Entumecimiento, parálisis o debilidad repentinos en la lesly, el brazo o la pierna, sobre todo si ocurre en un solo lado del cuerpo. ¨ Cambios repentinos en la visión. ¨ Dificultades repentinas para hablar. ¨ Confusión repentina o dificultad para comprender frases sencillas. ¨ Problemas repentinos para caminar o mantener el equilibrio. ¨ Dolor de [place] intenso y repentino, distinto de los wilfred de danilo anteriores. ?Llame a luevano médico ahora mismo o busque atención médica inmediata si:  ? · Tiene náuseas y vómito nuevos o [facility] que ya tenía. ? · Tiene fiebre nueva o más sara. ? · Luevano dolor de danilo [facility]. ?Preste especial atención a los cambios en luevano cecilio y asegúrese de comunicarse con luevano médico si:  ? · No mejora después de 2 días (48 horas). ¿Dónde puede encontrar más información en inglés? Cassius Garcia a http://tila-shalom.info/. Escriba C544 en la búsqueda para aprender más acerca de \"Dolor de danilo por tensión: Instrucciones de cuidado - [ Tension Headache: Care Instructions ]. \"  Revisado: 14 octubre, 2016  Versión del contenido: 11.4  © 8889-7826 Healthwise, [employer]. Las instrucciones de cuidado fueron adaptadas bajo licencia por Good Help Connections (which disclaims liability or warranty for this information). Si usted tiene [place] afección médica o sobre estas instrucciones, siempre pregunte a luevano profesional de cecilio. [facility] niega toda garantía o responsabilidad por luevano uso de esta información.

## 2018-06-01 NOTE — PROGRESS NOTES
present for triage and nurse assessment.     Thank you,      Grecia Vallecillo  CERTIFIED HEALTHCARE    Ce  [phone]  [email or web address]

## 2018-06-01 NOTE — ED PROVIDER NOTES
Patient is a 32 y.o. female presenting with headaches. The history is provided by the patient. Headache    This is a new problem. Episode onset: 5am. The problem occurs constantly. The problem has not changed since onset. The pain is located in the bilateral, frontal and temporal region. Quality: tight. The pain is mild. Pertinent negatives include no fever, no shortness of breath, no dizziness, no nausea and no vomiting. She has tried acetaminophen for the symptoms. The treatment provided no relief. Past Medical History:   Diagnosis Date    Anemia        Past Surgical History:   Procedure Laterality Date     DELIVERY ONLY           Family History:   Problem Relation Age of Onset    Hypertension Mother        Social History     Social History    Marital status: SINGLE     Spouse name: N/A    Number of children: N/A    Years of education: N/A     Occupational History    Not on file. Social History Main Topics    Smoking status: Never Smoker    Smokeless tobacco: Never Used    Alcohol use No    Drug use: No    Sexual activity: Yes     Partners: Male     Other Topics Concern    Not on file     Social History Narrative         ALLERGIES: Review of patient's allergies indicates no known allergies. Review of Systems   Constitutional: Negative for chills and fever. HENT: Negative for rhinorrhea and sore throat. Eyes: Negative for discharge and redness. Respiratory: Negative for cough and shortness of breath. Cardiovascular: Negative for chest pain. Gastrointestinal: Negative for abdominal pain, nausea and vomiting. Genitourinary: Negative for difficulty urinating, dysuria, vaginal bleeding and vaginal discharge. Musculoskeletal: Negative for arthralgias and back pain. Skin: Negative for rash. Neurological: Positive for headaches. Negative for dizziness, syncope and light-headedness. All other systems reviewed and are negative.       Vitals:    18 1626   BP: 113/73   Pulse: 74   Resp: 18   Temp: 98.1 °F (36.7 °C)   SpO2: 99%   Weight: 73.9 kg (163 lb)   Height: 5' 3\" (1.6 m)            Physical Exam   Constitutional: She is oriented to person, place, and time. She appears well-developed and well-nourished. No distress. HENT:   Head: Normocephalic and atraumatic. Right Ear: External ear normal.   Left Ear: External ear normal.   Nose: Right sinus exhibits frontal sinus tenderness. Right sinus exhibits no maxillary sinus tenderness. Left sinus exhibits frontal sinus tenderness. Left sinus exhibits no maxillary sinus tenderness. Mouth/Throat: Oropharynx is clear and moist. No oropharyngeal exudate. Eyes: Conjunctivae and EOM are normal. Pupils are equal, round, and reactive to light. Right eye exhibits no discharge. Left eye exhibits no discharge. Neck: Normal range of motion. Neck supple. Pulmonary/Chest: Effort normal. No respiratory distress. Musculoskeletal: Normal range of motion. Neurological: She is alert and oriented to person, place, and time. She has normal strength. She exhibits normal muscle tone. cni 2-12 grossly  Nl gait,  Nl speech     Skin: Skin is warm and dry. No rash noted. She is not diaphoretic. Psychiatric: She has a normal mood and affect. Her behavior is normal.   Nursing note and vitals reviewed. MDM  Number of Diagnoses or Management Options  Tension headache:   Third trimester pregnancy:   Diagnosis management comments: Medical decision making note:  Tension headache in 3rd trim  pregn  bp ok  Benign exam  Trial of fioricet and compazine  Rarely gets headaches    This concludes the \"medical decision making note\" part of this emergency department visit note.           ED Course       Procedures

## 2018-06-01 NOTE — ED NOTES
I have reviewed discharge instructions with the patient. The patient verbalized understanding. Patient left ED via Discharge Method: ambulatory to Home with self. Opportunity for questions and clarification provided. Patient given 2 scripts. Used [place] interpretor for discharge instructions. To continue your aftercare when you leave the hospital, you may receive an automated call from our care team to check in on how you are doing. This is a free service and part of our promise to provide the best care and service to meet your aftercare needs.  If you have questions, or wish to unsubscribe from this service please call [phone]. Thank you for Choosing our [facility] Emergency Department.

## 2018-06-10 ENCOUNTER — HOSPITAL ENCOUNTER (EMERGENCY)
Age: 28
Discharge: HOME | DRG: 540 | End: 2018-06-10
Attending: OBSTETRICS & GYNECOLOGY | Admitting: OBSTETRICS & GYNECOLOGY
Payer: MEDICAID

## 2018-06-10 VITALS
WEIGHT: 166 LBS | HEIGHT: 63 IN | HEART RATE: 78 BPM | BODY MASS INDEX: 29.41 KG/M2 | OXYGEN SATURATION: 98 % | RESPIRATION RATE: 18 BRPM | DIASTOLIC BLOOD PRESSURE: 72 MMHG | TEMPERATURE: 98.1 F | SYSTOLIC BLOOD PRESSURE: 112 MMHG

## 2018-06-10 PROCEDURE — 96374 THER/PROPH/DIAG INJ IV PUSH: CPT

## 2018-06-10 PROCEDURE — 74011000250 HC RX REV CODE- 250: Performed by: OBSTETRICS & GYNECOLOGY

## 2018-06-10 PROCEDURE — 74011250636 HC RX REV CODE- 250/636: Performed by: OBSTETRICS & GYNECOLOGY

## 2018-06-10 PROCEDURE — 75810000275 HC EMERGENCY DEPT VISIT NO LEVEL OF CARE: Performed by: EMERGENCY MEDICINE

## 2018-06-10 PROCEDURE — 96365 THER/PROPH/DIAG IV INF INIT: CPT

## 2018-06-10 PROCEDURE — 74011250637 HC RX REV CODE- 250/637: Performed by: OBSTETRICS & GYNECOLOGY

## 2018-06-10 PROCEDURE — 96361 HYDRATE IV INFUSION ADD-ON: CPT | Performed by: OBSTETRICS & GYNECOLOGY

## 2018-06-10 PROCEDURE — 99285 EMERGENCY DEPT VISIT HI MDM: CPT

## 2018-06-10 RX ORDER — OXYCODONE AND ACETAMINOPHEN 10; 325 MG/1; MG/1
1 TABLET ORAL ONCE
Status: COMPLETED | OUTPATIENT
Start: 2018-06-10 | End: 2018-06-10

## 2018-06-10 RX ADMIN — OXYCODONE HYDROCHLORIDE AND ACETAMINOPHEN 1 TABLET: 10; 325 TABLET ORAL at 09:48

## 2018-06-10 RX ADMIN — PROMETHAZINE HYDROCHLORIDE 12.5 MG: 25 INJECTION INTRAMUSCULAR; INTRAVENOUS at 09:51

## 2018-06-10 NOTE — PROGRESS NOTES
Discharge instructions reviewed. Pt verbalizes understanding. Percocet 10  and Phenergan given per MD order.

## 2018-06-10 NOTE — IP AVS SNAPSHOT
303 [facility] 
 
 
 [address] 
[phone] Patient: Estevan Stack MRN: [id number] JLA: About your hospitalization You were admitted on:  N/A You last received care in the:  SFE 4 DARLENE You were discharged on:  Kamini 10, 2018 Why you were hospitalized Your primary diagnosis was:  Not on File Your diagnoses also included:  Abdominal Pain During Pregnancy In Third Trimester Follow-up Information Follow up With Details Comments Contact Info None   None (395) Patient stated that they have no PCP Your Scheduled Appointments 2018  9:10 AM EDT  
OB VISIT with Marilynn Troncoso MD  
[facility]) [address]  [id number] [phone] 2018 12:30 PM EDT  
 with SFE OB OR  
SFE 4 L&D PROCEDURE ([place]) [facility] [address]  
[phone] 2018  SECTION with Marilynn Troncoso MD  
SFE 4 LABOR & DELIVERY (--)  
 [address]  
[phone] Discharge Orders None A check manan indicates which time of day the medication should be taken. My Medications ASK your doctor about these medications Instructions Each Dose to Equal  
 Morning Noon Evening Bedtime  
 ferrous sulfate 325 mg (65 mg iron) EC tablet Commonly known as:  IRON Your last dose was: Your next dose is: Take 1 Tab by mouth daily. Indications: Iron Deficiency Anemia 325 mg  
    
   
   
   
  
 prenatal vit-iron fumarate-fa 27 mg iron- 0.8 mg Tab tablet Your last dose was: Your next dose is: Take 1 Tab by mouth daily. Indications: pregnancy 1 Tab Discharge Instructions Conteo de las patadas de luevano bebé: Instrucciones de cuidado - [ Counting Your Baby's Kicks: Care Instructions ] Instrucciones de cuidado Contar las patadas de luevano bebé es maximo manera en la que luevano médico puede establecer si luevano bebé es saludable. La mayoría de las [facility], sobre todo en el primer embarazo, siente que luevano bebé se mueve por primera vez entre las semanas 12 y 25. El movimiento puede sentirse más kenyatta aleteos que kenyatta patadas. Es posible que luevano bebé se mueva más a ciertas horas del día. Cuando usted [place], puede notar menos patadas que cuando está descansando. En lucita visitas prenatales, luevano médico le preguntará si el bebé está activo. En el último trimestre, luevano médico le puede pedir que cuente la cantidad de veces que sienta que el bebé se [place]. La atención de seguimiento es maximo parte clave de luevano tratamiento y seguridad. Asegúrese de hacer y acudir a todas las citas, y llame a luevano médico si está teniendo problemas. También es maximo buena idea saber los resultados de los exámenes y mantener maximo lista de los medicamentos que blanca. Cómo se cuentan las patadas fetales? · Un método común para revisar el movimiento de luevano bebé es contar la cantidad de patadas o movimientos que sienta en 1 hora. Es normal sentir 10 movimientos (kenyatta patadas, aleteos o vueltas) en 1 hora. Algunos médicos sugieren que cuente zoraida la [facility] llegar a los 10 movimientos. Luego usted puede dejar de contar por raulito día y comenzar otra vez al día siguiente. · Para contar, elija el momento del día en que luevano bebé esté más activo. Podría ser cualquier momento entre la mañana y la noche. · Si no siente 10 movimientos en maximo hora, es posible que luevano bebé esté durmiendo. Espere hasta la próxima hora y vuelva a contar. Cuándo debe pedir ayuda? Llame a luevano médico ahora mismo o busque atención médica inmediata si: 
?  · Siente que luevano bebé ha dejado de moverse o se mueve mucho menos de lo normal.  
 ?Preste especial atención a los cambios en bernardo cecilio y asegúrese de comunicarse con bernardo médico si tiene cualquier problema. Dónde puede encontrar más información en inglés? Maciej Swift a [email or web address] Kamryn Dawkins B053 en la búsqueda para aprender más acerca de \"Conteo de las patadas de bernardo bebé: Instrucciones de cuidado - [ Counting Your Baby's Kicks: Care Instructions ]. \" 
Revisado: 16 marzo, 2017 Versión del contenido: 11.4 © 5000-6389 Healthwise, Incorporated. Las instrucciones de cuidado fueron adaptadas bajo licencia por Good [employer] Connections (which disclaims liability or warranty for this information). Si usted tiene [place] afección médica o sobre estas instrucciones, siempre pregunte a bernardo profesional de cecilio. Healthwise, Incorporated niega toda garantía o responsabilidad por bernardo uso de esta información. Precauciones en el embarazo: Instrucciones de cuidado - [ Pregnancy Precautions: Care Instructions ] Instrucciones de cuidado No hay maximo manera lai de prevenir el trabajo de parto antes de la fecha esperada (trabajo de parto prematuro) o de prevenir la mayoría de otros problemas en el [place]. Sab[place] hay cosas que puede hacer para aumentar las probabilidades de tener un embarazo saludable. Vaya a lucita citas, siga los consejos de bernardo médico y cuídese. Coma nataliia y bernie ejercicio (si bernardo médico lo permite). Y asegúrese de rinku abundante agua. La atención de seguimiento es maximo parte clave de bernardo tratamiento y seguridad. Asegúrese de hacer y acudir a todas las citas, y llame a bernardo médico si está teniendo problemas. También es maximo buena idea saber los resultados de los exámenes y mantener maximo lista de los medicamentos que blanca. Cómo puede cuidarse en el hogar? · Asegúrese de asistir a las citas prenatales. Bernardo médico le tomará la presión arterial en cada consulta.  Bernardo médico también comprobará si tiene proteínas en bernardo orina. Tanto la presión arterial sara kenyatta la presencia de proteínas en la orina son señales de preeclampsia. Esta afección puede ser peligrosa tanto para usted kenyatta para bernardo bebé. · John abundantes líquidos, suficientes para que bernardo orina sea de color amarillo larry o transparente kenyatta el agua. La deshidratación puede causar contracciones. Si tiene [employer], el corazón o el hígado y tiene que [place]'s líquidos, hable con bernardo médico antes de aumentar bernardo consumo. · Notifique a bernardo médico de inmediato si presenta cualquier síntoma de infección, tales kenyatta: ¨ Ardor cuando orina. ¨ Flujo con mal olor de la vagina. ¨ Comezón en la vagina. ¨ Fiebre sin explicación. ¨ Dolor o sensibilidad inusual en el útero o la parte baja del abdomen. · Aliméntese en forma equilibrada. Incluya muchos alimentos que edward ricos en calcio y viviane. ¨ Entre los alimentos ricos en calcio se incluyen la [place], el queso, el yogur, Sheryn Jose L y el brócoli. ¨ Entre los alimentos ricos en viviane se incluyen las porsha martinez, los [place], las aves, los [place], los frijoles, las uvas pasas, el pan de grano integral y las verduras de hojas verdes. · No fume. Si necesita ayuda para dejar de fumar, hable con bernardo médico sobre programas y medicamentos para dejar de fumar. Estos pueden aumentar lucita probabilidades de dejar el hábito para siempre. · No john alcohol ni use drogas ilegales. · Siga las instrucciones de bernardo médico acerca de la [place]. Bernardo médico le dirá cuánto ejercicio puede hacer. · Pregúntele a bernardo médico si puede tener [employer]. Si usted está en riesgo de tener trabajo de [place], bernardo médico podría pedirle que no tenga relaciones sexuales. · [place] precauciones para prevenir las caídas. Olman el embarazo las articulaciones están más sueltas y se tiene menos equilibrio.  Los deportes tales kenyatta el ciclismo, el esquí o el patinaje en línea pueden aumentar el riesgo de caídas. Y no monte a marcia, norma en motocicleta, bernie clavados, bernie esquí acuático, bucee, ni salte en paracaídas mientras está embarazada. · Evite calentarse demasiado. No use saunas ni bañeras de hidromasaje. Evite la exposición al sol en climas calientes por mucho tiempo. [place] acetaminofén (Tylenol) para bajar maximo fiebre sara. · No tome medicamentos de venta ziyad, productos herbarios ni suplementos sin hablar michelle con luevano médico o farmacéutico. 

Cuándo debe pedir ayuda? Llame al 911 en cualquier momento que considere que necesita atención de [place]. Por ejemplo, llame si: 
? · Se desmayó (perdió el conocimiento). ? · Tiene sangrado vaginal intenso. ? · Tiene dolor intenso en el vientre o la pelvis. ? · Le sale abundante líquido o gotea de la vagina y sabe o eduard que el cordón umbilical se está saliendo a luevano vagina. Si esto sucede, arrodíllese de inmediato, de masood forma que lucita nalgas estén más altas que luevano danilo. [place] disminuirá la presión sobre el cordón umbilical hasta que llegue la [facility]. ?Llame a luevano médico ahora mismo o busque atención médica inmediata si: 
? · Tiene señales de preeclampsia, tales kenyatta: ¨ Se le hinchan de manera repentina la lesly, las yonathan o los pies. ¨ Problemas nuevos con la visión (kenyatta oscurecimiento de la visión o visión borrosa). ¨ Dolor de danilo intenso. ? · Tiene cualquier sangrado vaginal.  
? · Tiene dolor abdominal o cólicos. ? · Tiene fiebre. ? · Ha tenido contracciones regulares (con o sin dolor) por [place]. [place] significa que tiene 8 o más contracciones en 1 hora o que tiene 4 contracciones o más en 20 minutos después de [place] de posición y rinku líquidos. ? · Tiene maximo pérdida repentina de líquido por la vagina. ? · Tiene dolor en la parte baja de la espalda o presión en la pelvis que no desaparece.   
? · Nota que luevano bebé ha dejado de moverse o se mueve mucho menos de lo normal.  
 ?Preste especial atención a los cambios en luevano cecilio y asegúrese de comunicarse con luevano médico si tiene algún problema. Dónde puede encontrar más información en inglés? Lesly Antonio a [email or web address] Tiago Res C218 en la búsqueda para aprender más acerca de \"Precauciones en el embarazo: Instrucciones de cuidado - [ Pregnancy Precautions: Care Instructions ]. \" 
Revisado: 16 marzo, 2017 Versión del contenido: 11.4 © 7648-1381 Healthwise, Incorporated. Las instrucciones de cuidado fueron adaptadas bajo licencia por Good Help Connections (which disclaims liability or warranty for this information). Si usted tiene [place] afección médica o sobre estas instrucciones, siempre pregunte a luevano profesional de cecilio. Healthwise, Incorporated niega toda garantía o responsabilidad por luevano uso de esta información. Introducing [facility]! [facility] introduce portal paciente MyChart . Ahora se puede acceder a partes de luevano expediente médico, enviar por correo electrónico la oficina de luevano médico y solicitar renovaciones de medicamentos en línea. En luevano navegador de Internet , [place] a [email or web address] Bernie clic en el usuario por Tobey Nissen? Janie Knott clic aquí en la sesión Elvia Wynn. Verá la página de registro [place]. Ingrese luevano código de [employer] masood y kenyatta aparece a continuación. Usted no tendrá que [employer] código después de brent completado el proceso de registro . Si usted no se inscribe antes de la fecha de caducidad , debe solicitar un nuevo código. · MyChart Código de acceso : [id number] Expires: 7/3/2018  4:45 PM 
 
Ingresa los últimos cuatro dígitos de luevano Número de Seguro Social ( xxxx ) y fecha de nacimiento ( dd / mm / aaaa ) kenyatta se indica y bernie clic en Enviar. Usted será llevado a la siguiente página de registro . Crear un ID MyChart .  Esta será luevano ID de inicio de sesión de MyChart y no puede ser [place] , por lo que pensar en maximo que es lai y fácil de recordar . Crear maximo contraseña MyChart . Usted puede cambiar luevano contraseña en cualquier momento . Ingrese luevano Password Reset de preguntas y Lau . [place] se puede utilizar en un momento posterior si usted olvida luevano contraseña. Introduzca luevano dirección de correo electrónico . Zeinab Mauricio recibirá maximo notificación por correo electrónico cuando la nueva información está disponible en MyChart . Damon [place] clic en Registrarse. Launie Dun felisa y descargar porciones de luevano expediente médico. 
Dasha clic en el enlace de descarga del menú Resumen para descargar maximo copia portátil de luevano información médica . Si tiene [employer] , por favor visite la sección de preguntas frecuentes del sitio web MyChart . Recuerde, MyChart NO es que se utilizará para las necesidades urgentes. Para emergencias médicas , llame al 911 . Ahora disponible en luevano iPhone y Android ! Introducing Abhi Martínez As a [facility] patient, I wanted to make you aware of our electronic visit tool called Abhi Martínez. [facility] 24/7 allows you to connect within minutes with a medical provider 24 hours a day, seven days a week via a mobile device or tablet or logging into a secure website from your computer. You can access Abhi Martínez from anywhere in the [place]. A virtual visit might be right for you when you have a simple condition and feel like you just dont want to get out of bed, or cant get away from work for an appointment, when your regular [facility] provider is not available (evenings, weekends or holidays), or when youre out of town and need minor care. Electronic visits cost only $49 and if the [facility] 24/7 provider determines a prescription is needed to treat your condition, one can be electronically transmitted to a nearby pharmacy*. Please take a moment to enroll today if you have not already done so.   The enrollment process is free and takes just a few minutes. To enroll, please download the 1bib 24/7 diana to your tablet or phone, or visit [email or web address] to enroll on your computer. And, as an [address] [place] [address] patient with a [employer] account, the results of your visits will be scanned into your electronic medical record and your primary care provider will be able to view the scanned results. We urge you to continue to see your regular 1bib provider for your ongoing medical care. And while your primary care provider may not be the one available when you seek a [employer] virtual visit, the peace of mind you get from getting a real diagnosis real time can be priceless. For more information on [employer], view our Frequently Asked Questions (FAQs) at [email or web address]. Sincerely, 
 
Dorothy Long MD 
Chief Medical Officer [employer] *:  certain medications cannot be prescribed via [employer] Providers Seen During Your Hospitalization Provider Specialty Primary office phone Chica Farrar MD Obstetrics & Gynecology [phone] Your Primary Care Physician (PCP) Primary Care Physician Office Phone Office Fax NONE ** None ** ** None ** You are allergic to the following No active allergies Recent Documentation Height Weight Breastfeeding? BMI OB Status Smoking Status 1.6 m 75.3 kg Yes 29.41 kg/m2 Pregnant Never Smoker Emergency Contacts Name Discharge Info Relation Home Work Mobile Gustabo Yeh [17] [phone] Patient Belongings The following personal items are in your possession at time of discharge: 
     Visual Aid: Glasses             Clothing: At bedside Please provide this summary of care documentation to your next provider.  
  
  
 
  
Signatures-by signing, you are acknowledging that this After Visit Summary has been reviewed with you and you have received a copy. Patient Signature:  ____________________________________________________________ Date:  ____________________________________________________________  
  
Michellejackelynsofialamberto Glaisabelle Provider Signature:  ____________________________________________________________ Date:  ____________________________________________________________  
  
  
   
  
David Jimmy Collado [address] [facility] 
[phone] Patient: Alberto Pickup MRN: [id number] BLM: Sobre bernardo hospitalización Le admitieron el:  N/A Bernardo tratamiento más reciente fue el:  SFE 4 DARLENE Le dieron de sara el:  Kamini 10, 2018 Por qué le ingresaron Bernardo diagnosis primaria es:  No está archivado/a Beranrdo diagnosis también incluye:  Abdominal Pain During Pregnancy In Third Trimester Follow-up Information Follow up With Details Comments Contact Info None   None (395) Patient stated that they have no PCP Your Scheduled Appointments 2018  9:10 AM EDT  
OB VISIT with Kai Aj MD  
[facility]) [address]  [address]  
[phone] 2018 12:30 PM EDT  
 with SFE OB OR  
SFE 4 L&D PROCEDURE ([address]) [place] [address] [id number] [phone] 2018  SECTION with Kai Aj MD  
SFE 4 LABOR & DELIVERY (--)  
 300 [facility] [address]  
[phone] Discharge Orders ZigaVite A check manan indicates which time of day the medication should be taken. My Medications CONSULTE con bernardo médico sobre HealthTeacher / GoNoodle Instructions Each Dose to Equal  
 Morning Noon Evening Bedtime  
 ferrous sulfate 325 mg (65 mg iron) EC tablet También conocido kenyatta:  IRON  
   
 Your last dose was: Your next dose is: Take 1 Tab by mouth daily. Indications: Iron Deficiency Anemia 325 mg  
    
   
   
   
  
 prenatal vit-iron fumarate-fa 27 mg iron- 0.8 mg Tab tablet Your last dose was: Your next dose is: Take 1 Tab by mouth daily. Indications: pregnancy 1 Tab Instrucciones a luis de sara Conteo de las patadas de luevano bebé: Instrucciones de cuidado - [ Counting Your Baby's Kicks: Care Instructions ] Instrucciones de cuidado Contar las patadas de luevano bebé es maximo manera en la que luevano médico puede establecer si luevano bebé es saludable. La mayoría de las [facility], sobre todo en el primer embarazo, siente que luevano bebé se mueve por primera vez entre las semanas 12 y 25. El movimiento puede sentirse más kenyatta aleteos que kenyatta patadas. Es posible que luevano bebé se mueva más a ciertas horas del día. Cuando usted [place], puede notar menos patadas que cuando está descansando. En lucita visitas prenatales, luevano médico le preguntará si el bebé está activo. En el último trimestre, luevano médico le puede pedir que cuente la cantidad de veces que sienta que el bebé se [place]. La atención de seguimiento es maximo parte clave de luevano tratamiento y seguridad. Asegúrese de hacer y acudir a todas las citas, y llame a luevano médico si está teniendo problemas. También es maximo buena idea saber los resultados de los exámenes y mantener maximo lista de los medicamentos que blanca. Cómo se cuentan las patadas fetales? · Un método común para revisar el movimiento de luevano bebé es contar la cantidad de patadas o movimientos que sienta en 1 hora. Es normal sentir 10 movimientos (kenyatta patadas, aleteos o vueltas) en 1 hora. Algunos médicos sugieren que cuente zoraida la [facility] llegar a los 10 movimientos. Luego usted puede dejar de contar por raulito día y comenzar otra vez al día siguiente. · Para contar, elija el momento del día en que luevano bebé esté más activo. Podría ser cualquier momento entre la mañana y la noche. · Si no siente 10 movimientos en maximo hora, es posible que luevano bebé esté durmiendo. Espere hasta la próxima hora y vuelva a contar. Cuándo debe pedir ayuda? Llame a luevano médico ahora mismo o busque atención médica inmediata si: 
? · Siente que luevano bebé ha dejado de moverse o se mueve mucho menos de lo normal. ?Preste especial atención a los cambios en luevano cecilio y asegúrese de comunicarse con luevano médico si tiene cualquier problema. Dónde puede encontrar más información en inglés? Sreedhar Waldron a [email or web address] Kaden Harrellache P091 en la búsqueda para aprender más acerca de \"Conteo de las patadas de luevano bebé: Instrucciones de cuidado - [ Counting Your Baby's Kicks: Care Instructions ]. \" 
Revisado: 16 marzo, 2017 Versión del contenido: 11.4 © 2244-3992 Healthwise, Incorporated. Las instrucciones de cuidado fueron adaptadas bajo licencia por Good Help Connections (which disclaims liability or warranty for this information). Si usted tiene [place] afección médica o sobre estas instrucciones, siempre pregunte a luevano profesional de cecilio. Healthwise, Incorporated niega toda garantía o responsabilidad por luevano uso de esta información. Precauciones en el embarazo: Instrucciones de cuidado - [ Pregnancy Precautions: Care Instructions ] Instrucciones de cuidado No hay maximo manera lai de prevenir el trabajo de parto antes de la fecha esperada (trabajo de parto prematuro) o de prevenir la mayoría de otros problemas en el [facility]. [place] hay cosas que puede hacer para aumentar las probabilidades de tener un embarazo saludable. Vaya a lucita citas, siga los consejos de luevano médico y cuídese. Coma nataliia y bernie ejercicio (si luevano médico lo permite). Y asegúrese de rinku abundante agua.  
[place] de seguimiento es maximo parte clave de luevano tratamiento y seguridad. Asegúrese de hacer y acudir a todas las citas, y llame a luevano médico si está teniendo problemas. También es maximo buena idea saber los resultados de los exámenes y mantener maximo lista de los medicamentos que blanca. Cómo puede cuidarse en el hogar? · Asegúrese de asistir a las citas prenatales. Luevano médico le tomará la presión arterial en cada consulta. Luevano médico también comprobará si tiene proteínas en luevano orina. Tanto la presión arterial sara kenyatta la presencia de proteínas en la orina son señales de preeclampsia. Esta afección puede ser peligrosa tanto para usted kenyatta para luevano bebé. · Samia abundantes líquidos, suficientes para que luevano orina sea de color amarillo larry o transparente kenyatta el agua. La deshidratación puede causar contracciones. Si tiene [employer], el corazón o el hígado y tiene que [place]'s líquidos, hable con luevano médico antes de aumentar luevano consumo. · Notifique a luevano médico de inmediato si presenta cualquier síntoma de infección, tales kenyatta: ¨ Ardor cuando orina. ¨ Flujo con mal olor de la vagina. ¨ Comezón en la vagina. ¨ Fiebre sin explicación. ¨ Dolor o sensibilidad inusual en el útero o la parte baja del abdomen. · Aliméntese en forma equilibrada. Incluya muchos alimentos que edward ricos en calcio y viviane. ¨ Entre los alimentos ricos en calcio se incluyen la [place], el queso, el yogur, Cathalene Bream y el brócoli. ¨ Entre los alimentos ricos en viviane se incluyen las porsha martinez, los [place], las aves, los [place], los frijoles, las uvas pasas, el pan de grano integral y las verduras de hojas verdes. · No fume. Si necesita ayuda para dejar de fumar, hable con luevano médico sobre programas y medicamentos para dejar de fumar. Estos pueden aumentar lucita probabilidades de dejar el hábito para siempre. · No samia alcohol ni use drogas ilegales. · Siga las instrucciones de luevano médico acerca de la [place]. Luevano médico le dirá cuánto ejercicio puede hacer. · Pregúntele a luevano médico si puede tener [employer]. Si usted está en riesgo de tener trabajo de [place], luevano médico podría pedirle que no tenga relaciones sexuales. · [place] precauciones para prevenir las caídas. Olman el embarazo las articulaciones están más sueltas y se tiene menos equilibrio. Los deportes tales kenyatta el ciclismo, el esquí o el patinaje en línea pueden aumentar el riesgo de caídas. Y no monte a marcia, norma en motocicleta, bernie clavados, bernie esquí acuático, bucee, ni salte en paracaídas mientras está embarazada. · Evite calentarse demasiado. No use saunas ni bañeras de hidromasaje. Evite la exposición al sol en climas calientes por mucho tiempo. [place] acetaminofén (Tylenol) para bajar maximo fiebre sara. · No tome medicamentos de venta ziyad, productos herbarios ni suplementos sin hablar michelle con luevano médico o farmacéutico. 

Cuándo debe pedir ayuda? Llame al 911 en cualquier momento que considere que necesita atención de [place]. Por ejemplo, llame si: 
? · Se desmayó (perdió el conocimiento). ? · Tiene sangrado vaginal intenso. ? · Tiene dolor intenso en el vientre o la pelvis. ? · Le sale abundante líquido o gotea de la vagina y sabe o eduard que el cordón umbilical se está saliendo a luevano vagina. Si esto sucede, arrodíllese de inmediato, de masood forma que lucita nalgas estén más altas que luevano danilo. [place] disminuirá la presión sobre el cordón umbilical hasta que llegue la [facility]. ?Llame a luevano médico ahora mismo o busque atención médica inmediata si: 
? · Tiene señales de preeclampsia, tales kenyatta: ¨ Se le hinchan de manera repentina la lesly, las yonathan o los pies. ¨ Problemas nuevos con la visión (kenyatta oscurecimiento de la visión o visión borrosa). ¨ Dolor de danilo intenso. ? · Tiene cualquier sangrado vaginal.  
? · Tiene dolor abdominal o cólicos. ? · Tiene fiebre. ? · Ha tenido contracciones regulares (con o sin dolor) por Celestine Ortega.  [place] significa que tiene 8 o más contracciones en 1 hora o que tiene 4 contracciones o más en 20 minutos después de [place] de posición y rinku líquidos. ? · Tiene maximo pérdida repentina de líquido por la vagina. ? · Tiene dolor en la parte baja de la espalda o presión en la pelvis que no desaparece. ? · Nota que luevano bebé ha dejado de moverse o se mueve mucho menos de lo normal. ?Preste especial atención a los cambios en luevano cecilio y asegúrese de comunicarse con luevano médico si tiene algún problema. Dónde puede encontrar más información en inglés? Gomez Colindres a [email or web address] Kadie Young [id number] en la búsqueda para aprender más acerca de \"Precauciones en el embarazo: Instrucciones de cuidado - [ Pregnancy Precautions: Care Instructions ]. \" 
Revisado: 16 marzo, 2017 Versión del contenido: 11.4 © 3299-6629 Healthwise, Incorporated. Las instrucciones de cuidado fueron adaptadas bajo licencia por [employer] (which disclaims liability or warranty for this information). Si usted tiene [place] afección médica o sobre estas instrucciones, siempre pregunte a luevano profesional de cecilio. Healthwise, Incorporated niega toda garantía o responsabilidad por luevano uso de esta información. Introducing [facility]! [facility] introduce portal paciente MyChart . Ahora se puede acceder a partes de luevano expediente médico, enviar por correo electrónico la oficina de luevano médico y solicitar renovaciones de medicamentos en línea. En luevano navegador de Internet , Shelli Quezada a [email or web address] Dasha clic en el usuario por Samra Evans? [employer]ception clic aquí en la sesión Karina Red. Verá la página de registro [place]. Ingrese luevano código de [facility] amsood y kenyatta aparece a continuación. Usted no tendrá que [employer] código después de brent completado el proceso de registro . Si kadie no se inscribe antes de la fecha de caducidad , debe solicitar un nuevo código. · MyChart Código de acceso : [id number] Expires: 7/3/2018  4:45 PM 
 
Ingresa los últimos cuatro dígitos de luevano Número de Seguro Social ( xxxx ) y fecha de nacimiento ( dd / mm / aaaa ) kenyatta se indica y dasha clic en Enviar. Usted será llevado a la siguiente página de registro . Crear un ID MyChart . Esta será luevano ID de inicio de sesión de MyChart y no puede ser [place] , por lo que pensar en maximo que es Nimisha Cherelle y fácil de recordar . Crear maximo contraseña MyChart . Usted puede cambiar luevano contraseña en cualquier momento . Ingrese luevano Password Reset de preguntas y Lau . [place] se puede utilizar en un momento posterior si usted olvida luevano contraseña. Introduzca luevano dirección de correo electrónico . Chandrika Peña recibirá maximo notificación por correo electrónico cuando la nueva información está disponible en MyChart . Cheron Percy fowler en Registrarse. Claudia Milling felisa y descargar porciones de luevano expediente médico. 
Dasha clic en el enlace de descarga del menú Resumen para descargar maximo copia portátil de luevano información médica . Si tiene [employer] , por favor visite la sección de preguntas frecuentes del sitio web MyChart . Recuerde, MyChart NO es que se utilizará para las necesidades urgentes. Para emergencias médicas , llame al 911 . Ahora disponible en luevano iPhone y Android ! Introducing Abhi Martínez As a [employer] patient, I wanted to make you aware of our electronic visit tool called Abhi Martínez. [employer] 24/7 allows you to connect within minutes with a medical provider 24 hours a day, seven days a week via a mobile device or tablet or logging into a secure website from your computer. You can access Abhi Martínez from anywhere in the [place].  
 
A virtual visit might be right for you when you have a simple condition and feel like you just dont want to get out of bed, or cant get away from work for an appointment, when your regular [employer] provider is not available (evenings, weekends or holidays), or when youre out of town and need minor care. Electronic visits cost only $49 and if the [facility] provider determines a prescription is needed to treat your condition, one can be electronically transmitted to a nearby pharmacy*. Please take a moment to enroll today if you have not already done so. The enrollment process is free and takes just a few minutes. To enroll, please download the [employer] diana to your tablet or phone, or visit [email or web address] to enroll on your computer. And, as an [address] [place] [address] patient with a [employer] account, the results of your visits will be scanned into your electronic medical record and your primary care provider will be able to view the scanned results. We urge you to continue to see your regular [facility] provider for your ongoing medical care. And while your primary care provider may not be the one available when you seek a Abhi Vanksenkaylafin virtual visit, the peace of mind you get from getting a real diagnosis real time can be priceless. For more information on [employer], view our Frequently Asked Questions (FAQs) at [email or web address]. Sincerely, 
 
Mulugeta Alfaro MD 
Chief Medical Officer [address] [place] [address] *:  certain medications cannot be prescribed via [employer] Providers Seen During Your Hospitalization Personal Médico [facility]specialidad Teléfono principal de la ofalaynaa Arlander Fothergill, MD Obstetrics & Gynecology [phone] Bernardo médico de atención primaria (PCP ) Primary Care Physician Office Phone Office Fax NONE ** None ** ** None ** Tiene alergias a lo siguiente No tiene alergias Documentación recientes Height Weight Está amamantando? BMI [facility]) Estado obstétrico Estatus de tabaquísmo 1.6 m 75.3 kg Yes 29.41 kg/m2 Pregnant Never Smoker Emergency Contacts Name Discharge Info Relation Home Work Mobile Gustabo Yeh [17] [phone] Patient Belongings The following personal items are in your possession at time of discharge: 
     Visual Aid: Glasses             Clothing: At bedside Please provide this summary of care documentation to your next provider. Signatures-by signing, you are acknowledging that this After Visit Summary has been reviewed with you and you have received a copy. Patient Signature:  ____________________________________________________________ Date:  ____________________________________________________________  
  
[facility] Provider Signature:  ____________________________________________________________ Date:  ____________________________________________________________

## 2018-06-10 NOTE — DISCHARGE INSTRUCTIONS
Conteo de las patadas de luevano bebé: Instrucciones de cuidado - [ Deretha Massed Your [de-identified] Kicks: Care Instructions ]  Instrucciones de cuidado    Contar las patadas de luevano bebé es maximo manera en la que luevano médico puede establecer si luevano bebé es saludable. La mayoría de las [facility], sobre todo en el primer embarazo, siente que luevano bebé se mueve por primera vez entre las semanas 12 y 25. El movimiento puede sentirse más kenyatta aleteos que kenyatta patadas. Es posible que luevano bebé se mueva más a ciertas horas del día. Cuando usmk [place], puede notar menos patadas que cuando está descansando. En lucita visitas prenatales, luevano médico le preguntará si el bebé está activo. En el último trimestre, luevano médico le puede pedir que cuente la cantidad de veces que sienta que el bebé se [place]. La atención de seguimiento es maximo parte clave de luevano tratamiento y seguridad. Asegúrese de hacer y acudir a todas las citas, y llame a luevano médico si está teniendo problemas. También es maximo buena idea saber los resultados de los exámenes y mantener maximo lista de los medicamentos que blanca. ¿Cómo se cuentan las patadas fetales? · Un método común para revisar el movimiento de luevano bebé es contar la cantidad de patadas o movimientos que sienta en 1 hora. Es normal sentir 10 movimientos (kenyatta patadas, aleteos o vueltas) en 1 hora. Algunos médicos sugieren que cuente zoraida la [facility] llegar a los 10 movimientos. Luego usted puede dejar de contar por raulito día y comenzar otra vez al día siguiente. · Para contar, elija el momento del día en que luevano bebé esté más activo. Podría ser cualquier momento entre la mañana y la noche. · Si no siente 10 movimientos en maximo hora, es posible que luevano bebé esté durmiendo. Espere hasta la próxima hora y vuelva a contar. ¿Cuándo debe pedir ayuda? Llame a luevano médico ahora mismo o busque atención médica inmediata si:  ?  · Siente que luevano bebé ha dejado de moverse o se mueve mucho menos de lo normal.   ?Preste especial atención a los [employer] bernardo cecilio y asegúrese de comunicarse con bernardo médico si tiene cualquier problema. ¿Dónde puede encontrar más información en inglés? Eloise Davis a [email or web address] Lilo Alexandreparisa A140 en la búsqueda para aprender más acerca de \"Conteo de las patadas de bernardo bebé: Instrucciones de cuidado - [ Counting Your Baby's Kicks: Care Instructions ]. \"  Revisado: 16 marzo, 2017  Versión del contenido: 11.4  © 4050-3492 Healthwise, Incorporated. Las instrucciones de cuidado fueron adaptadas bajo licencia por [employer] (which disclaims liability or warranty for this information). Si usted tiene [place] afección médica o sobre estas instrucciones, siempre pregunte a bernardo profesional de cecilio. Healthwise, Incorporated niega toda garantía o responsabilidad por bernardo uso de esta información. Precauciones en el embarazo: Instrucciones de cuidado - [ Pregnancy Precautions: Care Instructions ]  Instrucciones de cuidado    No hay maximo manera lai de prevenir el trabajo de parto antes de la fecha esperada (trabajo de parto prematuro) o de prevenir la mayoría de otros problemas en el [facility]. Sabina hay cosas que puede hacer para aumentar las probabilidades de tener un embarazo saludable. Vaya a lucita citas, siga los consejos de bernardo médico y cuídese. Coma nataliia y bernie ejercicio (si bernardo médico lo permite). Y asegúrese de rinku abundante agua. La atención de seguimiento es maximo parte clave de bernardo tratamiento y seguridad. Asegúrese de hacer y acudir a todas las citas, y llame a bernardo médico si está teniendo problemas. También es maximo buena idea saber los resultados de los exámenes y mantener maximo lista de los medicamentos que blanca. ¿Cómo puede cuidarse en el hogar? · Asegúrese de asistir a las citas prenatales. Bernardo médico le tomará la presión arterial en cada consulta. Bernardo médico también comprobará si tiene proteínas en bernardo orina.  Tanto la presión arterial sara kenyatta la presencia de proteínas en la orina son señales de preeclampsia. Esta afección puede ser peligrosa tanto para usted kenyatta para bernardo bebé. · John abundantes líquidos, suficientes para que bernardo orina sea de color amarillo larry o transparente kenyatta el agua. La deshidratación puede causar contracciones. Si tiene [employer], el corazón o el hígado y tiene que [place]'s líquidos, hable con bernardo médico antes de aumentar bernardo consumo. · Notifique a bernardo médico de inmediato si presenta cualquier síntoma de infección, tales kenyatta:  ¨ Ardor cuando orina. ¨ Flujo con mal olor de la vagina. ¨ Comezón en la vagina. ¨ Fiebre sin explicación. ¨ Dolor o sensibilidad inusual en el útero o la parte baja del abdomen. · Aliméntese en forma equilibrada. Incluya muchos alimentos que edward ricos en calcio y viviane. ¨ Entre los alimentos ricos en calcio se incluyen la [place], el queso, el yogur, Jourdan Cristela y el brócoli. ¨ Entre los alimentos ricos en viviane se incluyen las porsha martinez, los [place], las aves, los [place], los frijoles, las uvas pasas, el pan de grano integral y las verduras de hojas verdes. · No fume. Si necesita ayuda para dejar de fumar, hable con bernardo médico sobre programas y medicamentos para dejar de fumar. Estos pueden aumentar lucita probabilidades de dejar el hábito para siempre. · No john alcohol ni use drogas ilegales. · Siga las instrucciones de bernardo médico acerca de la [place]. Bernardo médico le dirá cuánto ejercicio puede hacer. · Pregúntele a bernardo médico si puede tener [employer]. Si usted está en riesgo de tener trabajo de [place], bernardo médico podría pedirle que no tenga relaciones sexuales. · [place] precauciones para prevenir las caídas. Olman el embarazo las articulaciones están más sueltas y se tiene menos equilibrio. Los deportes tales kenyatta el ciclismo, el esquí o el patinaje en línea pueden aumentar el riesgo de caídas.  Y no monte a marcia, norma en motocicleta, bernie clavados, bernie esquí acuático, bucee, ni salte en paracaídas mientras está embarazada. · Evite calentarse demasiado. No use saunas ni bañeras de hidromasaje. Evite la exposición al sol en climas calientes por mucho tiempo. [place] acetaminofén (Tylenol) para bajar maximo fiebre sara. · No tome medicamentos de venta ziyad, productos herbarios ni suplementos sin hablar michelle con luevano médico o farmacéutico.  ¿Cuándo debe pedir ayuda? Llame al 911 en cualquier momento que considere que necesita atención de [place]. Por ejemplo, llame si:  ? · Se desmayó (perdió el conocimiento). ? · Tiene sangrado vaginal intenso. ? · Tiene dolor intenso en el vientre o la pelvis. ? · Le sale abundante líquido o gotea de la vagina y sabe o eduard que el cordón umbilical se está saliendo a luevano vagina. Si esto sucede, arrodíllese de inmediato, de masood forma que lucita nalgas estén más altas que luevano danilo. [place] disminuirá la presión sobre el cordón umbilical hasta que llegue la [facility]. ?Llame a luevano médico ahora mismo o busque atención médica inmediata si:  ? · Tiene señales de preeclampsia, tales kenyatta:  ¨ Se le hinchan de manera repentina la lesly, las yonathan o los pies. ¨ Problemas nuevos con la visión (kenyatta oscurecimiento de la visión o visión borrosa). ¨ Dolor de danilo intenso. ? · Tiene cualquier sangrado vaginal.   ? · Tiene dolor abdominal o cólicos. ? · Tiene fiebre. ? · Ha tenido contracciones regulares (con o sin dolor) por [employer]. [place] significa que tiene 8 o más contracciones en 1 hora o que tiene 4 contracciones o más en 20 minutos después de [place]e [place] de posición y rinku líquidos. ? · Tiene maximo pérdida repentina de líquido por la vagina. ? · Tiene dolor en la parte baja de la espalda o presión en la pelvis que no desaparece. ? · Nota que luevano bebé ha dejado de moverse o se mueve mucho menos de lo normal.   ?Preste especial atención a los cambios en luevano cecilio y asegúrese de comunicarse con luevano médico si tiene algún problema.   ¿Dónde puede encontrar más información en inglés? [employer] a [email or web address] oJaquinaeveline Joshua Y054 en la búsqueda para aprender más acerca de \"Precauciones en el embarazo: Instrucciones de cuidado - [ Pregnancy Precautions: Care Instructions ]. \"  Revisado: 16 marzo, 2017  Versión del contenido: 11.4  © 8178-6575 Healthwise, Incorporated. Las instrucciones de cuidado fueron adaptadas bajo licencia por Good Help Connections (which disclaims liability or warranty for this information). Si usted tiene [place] afección médica o sobre estas instrucciones, siempre pregunte a luevano profesional de cecilio. BLAZER [employer] FLIP FLOPS, [employer]me niega toda garantía o responsabilidad por luevano uso de esta información.

## 2018-06-10 NOTE — IP AVS SNAPSHOT
Summary of Care Report The Summary of Care report has been created to help improve care coordination. Users with access to [employer] or [employer] [address] (Web-based application) may access additional patient information including the Discharge Summary. If you are not currently a [address] user and need more information, please call the number listed below in the [place] [address] section and ask to be connected with Medical Records. Facility Information Name Address Phone [id number] [place] [address] [address] [place] [id number] [phone] Patient Information Patient Name Sex  Estle Rom ([id number]) Female 1990 Discharge Information Admitting Provider Service Area Unit Florencio Marina MD / [address] [facility] [address] / [phone] Discharge Provider Discharge Date/Time Discharge Disposition Destination (none) 6/10/2018 (Pending) AHR (none) Patient Language Language Belarusian [40] Hospital Problems as of 6/10/2018  Reviewed: 2018  4:30 PM by Reji Ayon MD  
  
  
  
 Class Noted - Resolved Last Modified POA Active Problems Abdominal pain during pregnancy in third trimester  2018 - Present 6/10/2018 by Florencio Marina MD Unknown Entered by Chandrika Sharpe MD  
  
Non-Hospital Problems as of 6/10/2018  Reviewed: 2018  4:30 PM by Reji Ayon MD  
  
  
  
 Class Noted - Resolved Last Modified Active Problems Decreased fetal movement affecting management of pregnancy in third trimester  2018 - Present 2018 by Joshua Browne MD  
  Entered by Joshua Browne MD  
  
You are allergic to the following No active allergies Current Discharge Medication List  
  
ASK your doctor about these medications Dose & Instructions Dispensing Information Comments ferrous sulfate 325 mg (65 mg iron) EC tablet Commonly known as:  IRON Dose:  325 mg Take 1 Tab by mouth daily. Indications: Iron Deficiency Anemia Quantity:  30 Tab Refills:  3  
   
 prenatal vit-iron fumarate-fa 27 mg iron- 0.8 mg Tab tablet Dose:  1 Tab Take 1 Tab by mouth daily. Indications: pregnancy Quantity:  30 Tab Refills:  12 Follow-up Information Follow up With Details Comments Contact Info None   None (395) Patient stated that they have no PCP Discharge Instructions Conteo de las patadas de luevano bebé: Instrucciones de cuidado - [ Counting Your Baby's Kicks: Care Instructions ] Instrucciones de cuidado Contar las patadas de luevano bebé es maximo manera en la que luevano médico puede establecer si luevano bebé es saludable. La mayoría de las [facility], sobre todo en el primer embarazo, siente que luevano bebé se mueve por primera vez entre las semanas 12 y 25. El movimiento puede sentirse más kenyatta aleteos que kenyatta patadas. Es posible que luevano bebé se mueva más a ciertas horas del día. Cuando usted [place], puede notar menos patadas que cuando está descansando. En lucita visitas prenatales, luevano médico le preguntará si el bebé está activo. En el último trimestre, luevano médico le puede pedir que cuente la cantidad de veces que sienta que el bebé se [place]. La atención de seguimiento es maximo parte clave de luevano tratamiento y seguridad. Asegúrese de hacer y acudir a todas las citas, y llame a luevano médico si está teniendo problemas. También es maximo buena idea saber los resultados de los exámenes y mantener maximo lista de los medicamentos que blanca. Cómo se cuentan las patadas fetales? · Un método común para revisar el movimiento de luevano bebé es contar la cantidad de patadas o movimientos que sienta en 1 hora. Es normal sentir 10 movimientos (kneyatta patadas, aleteos o vueltas) en 1 hora.  Algunos médicos sugieren que cuente zoraida la [facility] llegar a los 10 movimientos. Luego usted puede dejar de contar por raulito día y comenzar otra vez al día siguiente. · Para contar, elija el momento del día en que luevano bebé esté más activo. Podría ser cualquier momento entre la mañana y la noche. · Si no siente 10 movimientos en maximo hora, es posible que luevano bebé esté durmiendo. Espere hasta la próxima hora y vuelva a contar. Cuándo debe pedir ayuda? Llame a luevano médico ahora mismo o busque atención médica inmediata si: 
? · Siente que luevano bebé ha dejado de moverse o se mueve mucho menos de lo normal. ?Preste especial atención a los cambios en luevano cecilio y asegúrese de comunicarse con luevano médico si tiene cualquier problema. Dónde puede encontrar más información en inglés? Eloise Davis a [email or web address]://tila-shalom.[email or web address] Lilo [place]omas C283 en la búsqueda para aprender más acerca de \"Conteo de las patadas de luevano bebé: Instrucciones de cuidado - [ Counting Your Baby's Kicks: Care Instructions ]. \" 
Revisado: 16 marzo, 2017 Versión del contenido: 11.4 © 7675-1143 Healthwise, Incorporated. Las instrucciones de cuidado fueron adaptadas bajo licencia por [employer] [address] [employer] (which disclaims liability or warranty for this information). Si usted tiene [place] afección médica o sobre estas instrucciones, siempre pregunte a luevano profesional de cecilio. Healthwise, Incorporated niega toda garantía o responsabilidad por luevano uso de esta información. Precauciones en el embarazo: Instrucciones de cuidado - [ Pregnancy Precautions: Care Instructions ] Instrucciones de cuidado No hay maximo manera lai de prevenir el trabajo de parto antes de la fecha esperada (trabajo de parto prematuro) o de prevenir la mayoría de otros problemas en el [facility]. Sabina hay cosas que puede hacer para aumentar las probabilidades de tener un embarazo saludable. Vaya a lucita citas, siga los consejos de luevano médico y cuídese.  Coma nataliia y bernie ejercicio (si luevano médico lo permite). Y asegúrese de rinku abundante agua. La atención de seguimiento es maximo parte clave de luevano tratamiento y seguridad. Asegúrese de hacer y acudir a todas las citas, y llame a luevano médico si está teniendo problemas. También es maximo buena idea saber los resultados de los exámenes y mantener maximo lista de los medicamentos que blanca. Cómo puede cuidarse en el hogar? · Asegúrese de asistir a las citas prenatales. Luevano médico le tomará la presión arterial en cada consulta. Luevano médico también comprobará si tiene proteínas en luevano orina. Tanto la presión arterial sara kenyatta la presencia de proteínas en la orina son señales de preeclampsia. Esta afección puede ser peligrosa tanto para usted kenyatta para luevano bebé. · Samia abundantes líquidos, suficientes para que luevano orina sea de color amarillo larry o transparente kenyatta el agua. La deshidratación puede causar contracciones. Si tiene [employer], el corazón o el hígado y tiene que Nathan's líquidos, hable con luevano médico antes de aumentar luevano consumo. · Notifique a luevano médico de inmediato si presenta cualquier síntoma de infección, tales kenyatta: ¨ Ardor cuando orina. ¨ Flujo con mal olor de la vagina. ¨ Comezón en la vagina. ¨ Fiebre sin explicación. ¨ Dolor o sensibilidad inusual en el útero o la parte baja del abdomen. · Aliméntese en forma equilibrada. Incluya muchos alimentos que edward ricos en calcio y viviane. ¨ Entre los alimentos ricos en calcio se incluyen la [place], el queso, el yogur, Dareen Males y el brócoli. ¨ Entre los alimentos ricos en viviane se incluyen las porsha martinez, los [place], las aves, los [place], los frijoles, las uvas pasas, el pan de grano integral y las verduras de hojas verdes. · No fume. Si necesita ayuda para dejar de fumar, hable con luevano médico sobre programas y medicamentos para dejar de fumar. Estos pueden aumentar lucita probabilidades de dejar el hábito para siempre. · No samia alcohol ni use drogas ilegales. · Siga las instrucciones de luevano médico acerca de la [place]. Luevano médico le dirá cuánto ejercicio puede hacer. · Pregúntele a luevano médico si puede tener [address]. Si usted está en riesgo de tener trabajo de [place], luevano médico podría pedirle que no tenga relaciones sexuales. · [place] precauciones para prevenir las caídas. Olman el embarazo las articulaciones están más sueltas y se tiene menos equilibrio. Los deportes tales kenyatta el ciclismo, el esquí o el patinaje en línea pueden aumentar el riesgo de caídas. Y no monte a marcia, norma en motocicleta, bernie clavados, bernie esquí acuático, bucee, ni salte en paracaídas mientras está embarazada. · Evite calentarse demasiado. No use saunas ni bañeras de hidromasaje. Evite la exposición al sol en climas calientes por mucho tiempo. [place] acetaminofén (Tylenol) para bajar maximo fiebre sara. · No tome medicamentos de venta ziyad, productos herbarios ni suplementos sin hablar michelle con luevano médico o farmacéutico. 

Cuándo debe pedir ayuda? Llame al 911 en cualquier momento que considere que necesita atención de [place]. Por ejemplo, llame si: 
? · Se desmayó (perdió el conocimiento). ? · Tiene sangrado vaginal intenso. ? · Tiene dolor intenso en el vientre o la pelvis. ? · Le sale abundante líquido o gotea de la vagina y sabe o eduard que el cordón umbilical se está saliendo a luevano vagina. Si esto sucede, arrodíllese de inmediato, de masood forma que lucita nalgas estén más altas que luevano danilo. [place] disminuirá la presión sobre el cordón umbilical hasta que llegue la [place]. ?Llame a luevano médico ahora mismo o busque atención médica inmediata si: 
? · Tiene señales de preeclampsia, tales kenyatta: ¨ Se le hinchan de manera repentina la lesly, las yonathan o los pies. ¨ Problemas nuevos con la visión (kenyatta oscurecimiento de la visión o visión borrosa). ¨ Dolor de danilo intenso. ? · Tiene cualquier sangrado vaginal.  
? · Tiene dolor abdominal o cólicos. ? · Tiene fiebre. ? · Ha tenido contracciones regulares (con o sin dolor) por [place]. [place] significa que tiene 8 o más contracciones en 1 hora o que tiene 4 contracciones o más en 20 minutos después de [place] de posición y rinku líquidos. ? · Tiene maximo pérdida repentina de líquido por la vagina. ? · Tiene dolor en la parte baja de la espalda o presión en la pelvis que no desaparece. ? · Nota que luevano bebé ha dejado de moverse o se mueve mucho menos de lo normal. ?Preste especial atención a los cambios en luevano cecilio y asegúrese de comunicarse con luevano médico si tiene algún problema. Dónde puede encontrar más información en inglés? Maciej Swift a [email or web address]-shalom.[email or web address] Kamryn Dawkins D228 en la búsqueda para aprender más acerca de \"Precauciones en el embarazo: Instrucciones de cuidado - [ Pregnancy Precautions: Care Instructions ]. \" 
Revisado: 16 marzo, 2017 Versión del contenido: 11.4 © 9314-2151 Healthwise, Incorporated. Las instrucciones de cuidado fueron adaptadas bajo licencia por [employer] (which disclaims liability or warranty for this information). Si usted tiene [place] afección médica o sobre estas instrucciones, siempre pregunte a luevano profesional de cecilio. Healthwise, Incorporated niega toda garantía o responsabilidad por luevano uso de esta información. Chart Review Routing History No Routing History on File

## 2018-06-10 NOTE — IP AVS SNAPSHOT
[address] [facility] [address] 
[phone] Patient: Florian Jewell MRN: [id number] KAU:[id number] A check manan indicates which time of day the medication should be taken. My Medications ASK your doctor about these medications Instructions Each Dose to Equal  
 Morning Noon Evening Bedtime  
 ferrous sulfate 325 mg (65 mg iron) EC tablet Commonly known as:  IRON Your last dose was: Your next dose is: Take 1 Tab by mouth daily. Indications: Iron Deficiency Anemia 325 mg  
    
   
   
   
  
 prenatal vit-iron fumarate-fa 27 mg iron- 0.8 mg Tab tablet Your last dose was: Your next dose is: Take 1 Tab by mouth daily. Indications: pregnancy 1 Tab

## 2018-06-10 NOTE — PROGRESS NOTES
present for nurse and doctor's assessments and discharge instructions        [email or web address]  c: [address]  [facility] / [place], [address] [facility]  [email or web address]

## 2018-06-10 NOTE — PROGRESS NOTES
Pt presented to [facility] from ER for contractions. EFM on. Pt does not speak [place]. Portuguese interpretor called.

## 2018-06-11 ENCOUNTER — HOSPITAL ENCOUNTER (INPATIENT)
Age: 28
LOS: 3 days | Discharge: HOME | DRG: 540 | End: 2018-06-14
Attending: OBSTETRICS & GYNECOLOGY | Admitting: OBSTETRICS & GYNECOLOGY
Payer: MEDICAID

## 2018-06-11 ENCOUNTER — ANESTHESIA (OUTPATIENT)
Dept: LABOR AND DELIVERY | Age: 28
DRG: 540 | End: 2018-06-11
Payer: MEDICAID

## 2018-06-11 ENCOUNTER — ANESTHESIA EVENT (OUTPATIENT)
Dept: LABOR AND DELIVERY | Age: 28
DRG: 540 | End: 2018-06-11
Payer: MEDICAID

## 2018-06-11 DIAGNOSIS — Z98.891 S/P CESAREAN SECTION: Primary | ICD-10-CM

## 2018-06-11 PROBLEM — Z37.9 NORMAL LABOR: Status: ACTIVE | Noted: 2018-06-11

## 2018-06-11 PROBLEM — O34.219 PREVIOUS CESAREAN SECTION COMPLICATING PREGNANCY: Status: ACTIVE | Noted: 2018-06-11

## 2018-06-11 PROBLEM — O26.893 ABDOMINAL PAIN DURING PREGNANCY IN THIRD TRIMESTER: Status: RESOLVED | Noted: 2018-05-30 | Resolved: 2018-06-11

## 2018-06-11 PROBLEM — R10.9 ABDOMINAL PAIN DURING PREGNANCY IN THIRD TRIMESTER: Status: RESOLVED | Noted: 2018-05-30 | Resolved: 2018-06-11

## 2018-06-11 PROBLEM — O36.8130 DECREASED FETAL MOVEMENT AFFECTING MANAGEMENT OF PREGNANCY IN THIRD TRIMESTER: Status: RESOLVED | Noted: 2018-05-20 | Resolved: 2018-06-11

## 2018-06-11 PROBLEM — Z37.9 NORMAL LABOR: Status: RESOLVED | Noted: 2018-06-11 | Resolved: 2018-06-11

## 2018-06-11 LAB
ABO + RH BLD: NORMAL
BASE DEFICIT BLDCOA-SCNC: 1.7 MMOL/L (ref 0–2)
BASE DEFICIT BLDCOV-SCNC: 1.7 MMOL/L (ref 1.9–7.7)
BDY SITE: ABNORMAL
BDY SITE: ABNORMAL
BLOOD GROUP ANTIBODIES SERPL: NORMAL
ERYTHROCYTE [DISTWIDTH] IN BLOOD BY AUTOMATED COUNT: 14.8 % (ref 11.9–14.6)
HCO3 BLDCOA-SCNC: 26 MMOL/L (ref 22–26)
HCO3 BLDV-SCNC: 25 MMOL/L
HCT VFR BLD AUTO: 41.3 % (ref 35.8–46.3)
HGB BLD-MCNC: 13.6 G/DL (ref 11.7–15.4)
MCH RBC QN AUTO: 27.6 PG (ref 26.1–32.9)
MCHC RBC AUTO-ENTMCNC: 32.9 G/DL (ref 31.4–35)
MCV RBC AUTO: 83.9 FL (ref 79.6–97.8)
PCO2 BLDCOA: 54 MMHG (ref 33–49)
PCO2 BLDCOV: 47 MMHG (ref 14.1–43.3)
PH BLDCOA: 7.3 [PH] (ref 7.21–7.31)
PH BLDCOV: 7.33 [PH] (ref 7.2–7.44)
PLATELET # BLD AUTO: 255 K/UL (ref 150–450)
PMV BLD AUTO: 11 FL (ref 10.8–14.1)
PO2 BLDCOA: 17 MMHG (ref 9–19)
PO2 BLDV: 22 MMHG (ref 30.4–57.2)
RBC # BLD AUTO: 4.92 M/UL (ref 4.05–5.25)
SERVICE CMNT-IMP: ABNORMAL
SERVICE CMNT-IMP: ABNORMAL
SPECIMEN EXP DATE BLD: NORMAL
WBC # BLD AUTO: 11.3 K/UL (ref 4.3–11.1)

## 2018-06-11 PROCEDURE — 74011250636 HC RX REV CODE- 250/636

## 2018-06-11 PROCEDURE — 77030003665 HC NDL SPN BBMI -A: Performed by: ANESTHESIOLOGY

## 2018-06-11 PROCEDURE — 74011250637 HC RX REV CODE- 250/637: Performed by: ANESTHESIOLOGY

## 2018-06-11 PROCEDURE — 77030018836 HC SOL IRR NACL ICUM -A: Performed by: OBSTETRICS & GYNECOLOGY

## 2018-06-11 PROCEDURE — 74011250636 HC RX REV CODE- 250/636: Performed by: ANESTHESIOLOGY

## 2018-06-11 PROCEDURE — 85027 COMPLETE CBC AUTOMATED: CPT | Performed by: OBSTETRICS & GYNECOLOGY

## 2018-06-11 PROCEDURE — 59025 FETAL NON-STRESS TEST: CPT

## 2018-06-11 PROCEDURE — 76060000078 HC EPIDURAL ANESTHESIA: Performed by: OBSTETRICS & GYNECOLOGY

## 2018-06-11 PROCEDURE — 74011000250 HC RX REV CODE- 250

## 2018-06-11 PROCEDURE — 4A1HXCZ MONITORING OF PRODUCTS OF CONCEPTION, CARDIAC RATE, EXTERNAL APPROACH: ICD-10-PCS | Performed by: OBSTETRICS & GYNECOLOGY

## 2018-06-11 PROCEDURE — 82803 BLOOD GASES ANY COMBINATION: CPT

## 2018-06-11 PROCEDURE — 75410000003 HC RECOV DEL/VAG/CSECN EA 0.5 HR: Performed by: OBSTETRICS & GYNECOLOGY

## 2018-06-11 PROCEDURE — 77030002888 HC SUT CHRMC J&J -A: Performed by: OBSTETRICS & GYNECOLOGY

## 2018-06-11 PROCEDURE — 74011250637 HC RX REV CODE- 250/637: Performed by: OBSTETRICS & GYNECOLOGY

## 2018-06-11 PROCEDURE — 86900 BLOOD TYPING SEROLOGIC ABO: CPT | Performed by: OBSTETRICS & GYNECOLOGY

## 2018-06-11 PROCEDURE — 77030031139 HC SUT VCRL2 J&J -A: Performed by: OBSTETRICS & GYNECOLOGY

## 2018-06-11 PROCEDURE — 65270000029 HC RM PRIVATE

## 2018-06-11 PROCEDURE — 77030007880 HC KT SPN EPDRL BBMI -B: Performed by: ANESTHESIOLOGY

## 2018-06-11 PROCEDURE — 76010000391 HC C SECN FIRST 1 HR: Performed by: OBSTETRICS & GYNECOLOGY

## 2018-06-11 PROCEDURE — 74011250636 HC RX REV CODE- 250/636: Performed by: OBSTETRICS & GYNECOLOGY

## 2018-06-11 PROCEDURE — 76010000392 HC C SECN EA ADDL 0.5 HR: Performed by: OBSTETRICS & GYNECOLOGY

## 2018-06-11 PROCEDURE — 77030011640 HC PAD GRND REM COVD -A: Performed by: OBSTETRICS & GYNECOLOGY

## 2018-06-11 PROCEDURE — 36415 COLL VENOUS BLD VENIPUNCTURE: CPT | Performed by: OBSTETRICS & GYNECOLOGY

## 2018-06-11 PROCEDURE — 77030018846 HC SOL IRR STRL H20 ICUM -A: Performed by: OBSTETRICS & GYNECOLOGY

## 2018-06-11 PROCEDURE — 77030032490 HC SLV COMPR SCD KNE COVD -B: Performed by: OBSTETRICS & GYNECOLOGY

## 2018-06-11 PROCEDURE — 77030005518 HC CATH URETH FOL 2W BARD -B: Performed by: OBSTETRICS & GYNECOLOGY

## 2018-06-11 PROCEDURE — 77030020255 HC SOL INJ LR 1000ML BG

## 2018-06-11 RX ORDER — OXYTOCIN/RINGER'S LACTATE 30/500 ML
250 PLASTIC BAG, INJECTION (ML) INTRAVENOUS ONCE
Status: DISCONTINUED | OUTPATIENT
Start: 2018-06-11 | End: 2018-06-11 | Stop reason: HOSPADM

## 2018-06-11 RX ORDER — BUPIVACAINE HYDROCHLORIDE 7.5 MG/ML
INJECTION, SOLUTION INTRASPINAL AS NEEDED
Status: DISCONTINUED | OUTPATIENT
Start: 2018-06-11 | End: 2018-06-11 | Stop reason: HOSPADM

## 2018-06-11 RX ORDER — SODIUM CHLORIDE, SODIUM LACTATE, POTASSIUM CHLORIDE, CALCIUM CHLORIDE 600; 310; 30; 20 MG/100ML; MG/100ML; MG/100ML; MG/100ML
INJECTION, SOLUTION INTRAVENOUS
Status: DISCONTINUED | OUTPATIENT
Start: 2018-06-11 | End: 2018-06-11 | Stop reason: HOSPADM

## 2018-06-11 RX ORDER — SODIUM CHLORIDE 0.9 % (FLUSH) 0.9 %
5-10 SYRINGE (ML) INJECTION EVERY 8 HOURS
Status: DISCONTINUED | OUTPATIENT
Start: 2018-06-11 | End: 2018-06-11 | Stop reason: HOSPADM

## 2018-06-11 RX ORDER — OXYCODONE HYDROCHLORIDE 5 MG/1
5 TABLET ORAL
Status: DISCONTINUED | OUTPATIENT
Start: 2018-06-11 | End: 2018-06-12

## 2018-06-11 RX ORDER — AMOXICILLIN 250 MG
2 CAPSULE ORAL DAILY
Status: DISCONTINUED | OUTPATIENT
Start: 2018-06-12 | End: 2018-06-14 | Stop reason: HOSPADM

## 2018-06-11 RX ORDER — TRISODIUM CITRATE DIHYDRATE AND CITRIC ACID MONOHYDRATE 500; 334 MG/5ML; MG/5ML
30 SOLUTION ORAL ONCE
Status: COMPLETED | OUTPATIENT
Start: 2018-06-11 | End: 2018-06-11

## 2018-06-11 RX ORDER — HYDROMORPHONE HYDROCHLORIDE 1 MG/ML
1 INJECTION, SOLUTION INTRAMUSCULAR; INTRAVENOUS; SUBCUTANEOUS
Status: DISCONTINUED | OUTPATIENT
Start: 2018-06-11 | End: 2018-06-12

## 2018-06-11 RX ORDER — FAMOTIDINE 20 MG/1
20 TABLET, FILM COATED ORAL ONCE
Status: COMPLETED | OUTPATIENT
Start: 2018-06-11 | End: 2018-06-11

## 2018-06-11 RX ORDER — ONDANSETRON 2 MG/ML
INJECTION INTRAMUSCULAR; INTRAVENOUS AS NEEDED
Status: DISCONTINUED | OUTPATIENT
Start: 2018-06-11 | End: 2018-06-11 | Stop reason: HOSPADM

## 2018-06-11 RX ORDER — CEFAZOLIN SODIUM/WATER 2 G/20 ML
2 SYRINGE (ML) INTRAVENOUS EVERY 8 HOURS
Status: COMPLETED | OUTPATIENT
Start: 2018-06-11 | End: 2018-06-12

## 2018-06-11 RX ORDER — MORPHINE SULFATE 0.5 MG/ML
INJECTION, SOLUTION EPIDURAL; INTRATHECAL; INTRAVENOUS AS NEEDED
Status: DISCONTINUED | OUTPATIENT
Start: 2018-06-11 | End: 2018-06-11 | Stop reason: HOSPADM

## 2018-06-11 RX ORDER — DEXTROSE, SODIUM CHLORIDE, SODIUM LACTATE, POTASSIUM CHLORIDE, AND CALCIUM CHLORIDE 5; .6; .31; .03; .02 G/100ML; G/100ML; G/100ML; G/100ML; G/100ML
125 INJECTION, SOLUTION INTRAVENOUS CONTINUOUS
Status: DISCONTINUED | OUTPATIENT
Start: 2018-06-11 | End: 2018-06-11 | Stop reason: HOSPADM

## 2018-06-11 RX ORDER — NALBUPHINE HYDROCHLORIDE 20 MG/ML
5 INJECTION, SOLUTION INTRAMUSCULAR; INTRAVENOUS; SUBCUTANEOUS
Status: DISCONTINUED | OUTPATIENT
Start: 2018-06-11 | End: 2018-06-12

## 2018-06-11 RX ORDER — METOCLOPRAMIDE 10 MG/1
10 TABLET ORAL ONCE
Status: COMPLETED | OUTPATIENT
Start: 2018-06-11 | End: 2018-06-11

## 2018-06-11 RX ORDER — PRENATAL VIT 96/IRON FUM/FOLIC 27MG-0.8MG
1 TABLET ORAL DAILY
Status: DISCONTINUED | OUTPATIENT
Start: 2018-06-12 | End: 2018-06-14 | Stop reason: HOSPADM

## 2018-06-11 RX ORDER — SODIUM CHLORIDE 0.9 % (FLUSH) 0.9 %
5-10 SYRINGE (ML) INJECTION EVERY 8 HOURS
Status: DISCONTINUED | OUTPATIENT
Start: 2018-06-11 | End: 2018-06-12 | Stop reason: ALTCHOICE

## 2018-06-11 RX ORDER — OXYTOCIN/RINGER'S LACTATE 30/500 ML
PLASTIC BAG, INJECTION (ML) INTRAVENOUS
Status: DISCONTINUED | OUTPATIENT
Start: 2018-06-11 | End: 2018-06-11 | Stop reason: HOSPADM

## 2018-06-11 RX ORDER — SIMETHICONE 80 MG
80 TABLET,CHEWABLE ORAL
Status: DISCONTINUED | OUTPATIENT
Start: 2018-06-11 | End: 2018-06-14 | Stop reason: HOSPADM

## 2018-06-11 RX ORDER — SODIUM CHLORIDE 0.9 % (FLUSH) 0.9 %
5-10 SYRINGE (ML) INJECTION AS NEEDED
Status: DISCONTINUED | OUTPATIENT
Start: 2018-06-11 | End: 2018-06-12 | Stop reason: ALTCHOICE

## 2018-06-11 RX ORDER — KETOROLAC TROMETHAMINE 30 MG/ML
30 INJECTION, SOLUTION INTRAMUSCULAR; INTRAVENOUS
Status: DISCONTINUED | OUTPATIENT
Start: 2018-06-11 | End: 2018-06-12

## 2018-06-11 RX ORDER — ACETAMINOPHEN 500 MG
1000 TABLET ORAL EVERY 8 HOURS
Status: COMPLETED | OUTPATIENT
Start: 2018-06-11 | End: 2018-06-12

## 2018-06-11 RX ORDER — CEFAZOLIN SODIUM/WATER 2 G/20 ML
2 SYRINGE (ML) INTRAVENOUS ONCE
Status: COMPLETED | OUTPATIENT
Start: 2018-06-11 | End: 2018-06-11

## 2018-06-11 RX ORDER — SODIUM CHLORIDE 0.9 % (FLUSH) 0.9 %
5-10 SYRINGE (ML) INJECTION AS NEEDED
Status: DISCONTINUED | OUTPATIENT
Start: 2018-06-11 | End: 2018-06-11 | Stop reason: HOSPADM

## 2018-06-11 RX ORDER — SODIUM CHLORIDE, SODIUM LACTATE, POTASSIUM CHLORIDE, CALCIUM CHLORIDE 600; 310; 30; 20 MG/100ML; MG/100ML; MG/100ML; MG/100ML
125 INJECTION, SOLUTION INTRAVENOUS CONTINUOUS
Status: DISCONTINUED | OUTPATIENT
Start: 2018-06-11 | End: 2018-06-12

## 2018-06-11 RX ADMIN — KETOROLAC TROMETHAMINE 30 MG: 30 INJECTION, SOLUTION INTRAMUSCULAR at 17:26

## 2018-06-11 RX ADMIN — SODIUM CHLORIDE, SODIUM LACTATE, POTASSIUM CHLORIDE, CALCIUM CHLORIDE: 600; 310; 30; 20 INJECTION, SOLUTION INTRAVENOUS at 16:24

## 2018-06-11 RX ADMIN — SODIUM CITRATE AND CITRIC ACID MONOHYDRATE 30 ML: 500; 334 SOLUTION ORAL at 15:04

## 2018-06-11 RX ADMIN — SODIUM CHLORIDE, SODIUM LACTATE, POTASSIUM CHLORIDE, AND CALCIUM CHLORIDE 125 ML/HR: 600; 310; 30; 20 INJECTION, SOLUTION INTRAVENOUS at 21:26

## 2018-06-11 RX ADMIN — METOCLOPRAMIDE HYDROCHLORIDE 10 MG: 10 TABLET ORAL at 15:04

## 2018-06-11 RX ADMIN — SIMETHICONE CHEW TAB 80 MG 80 MG: 80 TABLET ORAL at 22:04

## 2018-06-11 RX ADMIN — BUPIVACAINE HYDROCHLORIDE 1.6 ML: 7.5 INJECTION, SOLUTION INTRASPINAL at 15:37

## 2018-06-11 RX ADMIN — SODIUM CHLORIDE, SODIUM LACTATE, POTASSIUM CHLORIDE, CALCIUM CHLORIDE: 600; 310; 30; 20 INJECTION, SOLUTION INTRAVENOUS at 15:28

## 2018-06-11 RX ADMIN — MORPHINE SULFATE 150 MCG: 0.5 INJECTION, SOLUTION EPIDURAL; INTRATHECAL; INTRAVENOUS at 15:37

## 2018-06-11 RX ADMIN — Medication 2 G: at 15:03

## 2018-06-11 RX ADMIN — NALBUPHINE HYDROCHLORIDE 5 MG: 20 INJECTION, SOLUTION INTRAMUSCULAR; INTRAVENOUS; SUBCUTANEOUS at 20:00

## 2018-06-11 RX ADMIN — Medication 2 G: at 22:04

## 2018-06-11 RX ADMIN — ACETAMINOPHEN 1000 MG: 500 TABLET, FILM COATED ORAL at 19:56

## 2018-06-11 RX ADMIN — HYDROMORPHONE HYDROCHLORIDE 1 MG: 1 INJECTION, SOLUTION INTRAMUSCULAR; INTRAVENOUS; SUBCUTANEOUS at 23:05

## 2018-06-11 RX ADMIN — OXYCODONE HYDROCHLORIDE 5 MG: 5 TABLET ORAL at 19:57

## 2018-06-11 RX ADMIN — FAMOTIDINE 20 MG: 20 TABLET ORAL at 15:04

## 2018-06-11 RX ADMIN — ONDANSETRON 4 MG: 2 INJECTION INTRAMUSCULAR; INTRAVENOUS at 16:05

## 2018-06-11 RX ADMIN — Medication 500 ML/HR: at 15:58

## 2018-06-11 NOTE — IP AVS SNAPSHOT
[id number] 
[phone] Patient: Bridgett Labor MRN: [id number] HST: About your hospitalization You were admitted on:  2018 You last received care in the:  [address] [facility] You were discharged on:  2018 Why you were hospitalized Your primary diagnosis was:  S/P  Section Your diagnoses also included:  Normal Labor Follow-up Information Follow up With Details Comments Contact Info Chica Farrar MD On 2018 at 10:15 am [address] [place] [address]  [address] 
[phone] None   None (395) Patient stated that they have no PCP Chica Farrar MD  2018 at 10:15 am [address] [place] [address]  [address] 
[phone] Your Scheduled Appointments 2018 10:10 AM EDT Global Post Op with Chica Farrar MD  
[facility]) [address]  [address]  
[phone] Discharge Orders None A check manan indicates which time of day the medication should be taken. My Medications START taking these medications Instructions Each Dose to Equal  
 Morning Noon Evening Bedtime  
 ibuprofen 800 mg tablet Commonly known as:  MOTRIN Your last dose was: Your next dose is: Take 1 Tab by mouth every six (6) hours as needed. 800 mg  
    
   
   
   
  
 oxyCODONE-acetaminophen 5-325 mg per tablet Commonly known as:  PERCOCET Your last dose was: Your next dose is: Take 1-2 Tabs by mouth every four (4) hours as needed. Max Daily Amount: 12 Tabs. 1-2 Tab CONTINUE taking these medications Instructions Each Dose to Equal  
 Morning Noon Evening Bedtime  
 ferrous sulfate 325 mg (65 mg iron) EC tablet Commonly known as:  IRON Your last dose was: Your next dose is: Take 1 Tab by mouth daily. Indications: Iron Deficiency Anemia 325 mg  
    
   
   
   
  
 prenatal vit-iron fumarate-fa 27 mg iron- 0.8 mg Tab tablet Your last dose was: Your next dose is: Take 1 Tab by mouth daily. Indications: pregnancy 1 Tab Where to Get Your Medications These medications were sent to [facility], [address] AT [facility], [address] Phone:  [phone]  
  ibuprofen 800 mg tablet Information on where to get these meds will be given to you by the nurse or doctor. ! Ask your nurse or doctor about these medications  
  oxyCODONE-acetaminophen 5-325 mg per tablet Opioid Education Prescription Opioids: What You Need to Know: 
 
Prescription opioids can be used to help relieve moderate-to-severe pain and are often prescribed following a surgery or injury, or for certain health conditions. These medications can be an important part of treatment but also come with serious risks. Opioids are strong pain medicines. Examples include hydrocodone, oxycodone, fentanyl, and morphine. Heroin is an example of an illegal opioid. It is important to work with your health care provider to make sure you are getting the safest, most effective care. WHAT ARE THE RISKS AND SIDE EFFECTS OF OPIOID USE? Prescription opioids carry serious risks of addiction and overdose, especially with prolonged use. An opioid overdose, often marked by slow breathing, can cause sudden death. The use of prescription opioids can have a number of side effects as well, even when taken as directed. · Tolerance-meaning you might need to take more of a medication for the same pain relief · Physical dependence-meaning you have symptoms of withdrawal when the medication is stopped. Withdrawal symptoms can include nausea, sweating, chills, diarrhea, stomach cramps, and muscle aches. Withdrawal can last up to several weeks, depending on which drug you took and how long you took it. · Increased sensitivity to pain · Constipation · Nausea, vomiting, and dry mouth · Sleepiness and dizziness · Confusion · Depression · Low levels of testosterone that can result in lower sex drive, energy, and strength · Itching and sweating RISKS ARE GREATER WITH:      
· History of drug misuse, substance use disorder, or overdose · Mental health conditions (such as depression or anxiety) · Sleep apnea · Older age (72 years or older) · Pregnancy Avoid alcohol while taking prescription opioids. Also, unless specifically advised by your health care provider, medications to avoid include: · Benzodiazepines (such as Xanax or Valium) · Muscle relaxants (such as Soma or Flexeril) · Hypnotics (such as Ambien or Lunesta) · Other prescription opioids KNOW YOUR OPTIONS Talk to your health care provider about ways to manage your pain that don't involve prescription opioids. Some of these options may actually work better and have fewer risks and side effects. Options may include: 
· Pain relievers such as acetaminophen, ibuprofen, and naproxen · Some medications that are also used for depression or seizures · Physical therapy and exercise · Counseling to help patients learn how to cope better with triggers of pain and stress. · Application of heat or cold compress · Massage therapy · Relaxation techniques Be Informed Make sure you know the name of your medication, how much and how often to take it, and its potential risks & side effects. IF YOU ARE PRESCRIBED OPIOIDS FOR PAIN: 
· Never take opioids in greater amounts or more often than prescribed. Remember the goal is not to be pain-free but to manage your pain at a tolerable level. · Follow up with your primary care provider to: · Work together to create a plan on how to manage your pain. · Talk about ways to help manage your pain that don't involve prescription opioids. · Talk about any and all concerns and side effects. · Help prevent misuse and abuse. · Never sell or share prescription opioids · Help prevent misuse and abuse. · Store prescription opioids in a secure place and out of reach of others (this may include visitors, children, friends, and family). · Safely dispose of unused/unwanted prescription opioids: Find your community drug take-back program or your pharmacy mail-back program, or flush them down the toilet, following guidance from the Food and Drug Administration (www.fda.gov/Drugs/ResourcesForYou). · Visit www.cdc.gov/drugoverdose to learn about the risks of opioid abuse and overdose. · If you believe you may be struggling with addiction, tell your health care provider and ask for guidance or call [employer] at 4-315-738-AZGZ. Discharge Instructions Después del parto (período de posparto): Instrucciones de cuidado - [ After Your Delivery (the Postpartum Period): Care Instructions ] Instrucciones de cuidado Felicidades por el nacimiento de luevano bebé. Al igual que el Gianna, el tiempo con el recién nacido puede ser un momento de [place], Tiana Elijah y agotamiento. Es posible que se sienta serrano al mirar la sulema de luevano pequeño bebé. También podría sentirse abrumada por luevano nuevo ritmo de sueño y las nuevas responsabilidades. Al principio, los bebés suelen dormir zoraida el día y permanecen despiertos zoraida la noche. No tienen ningún patrón ni rutina. Podrían luis gritos ahogados, sacudirse y despertarse, o parecer kenyatta si tuvieran los ojos cruzados (bizcos). Todo esto es normal, e incluso la pueden hacer sonreír. Zoraiad las primeras semanas siguientes al parto, trate de cuidarse nataliia. Podría tardar de 4 a 6 semanas en volver a sentirse usted misma, y posiblemente más tiempo si le alcaraz hecho maximo cesárea. Es probable que se sienta muy fatigada zoraida varias semanas. Viviana días estarán llenos de [place], mikayla también de mucha alegría. La atención de seguimiento es maximo parte clave de luevano tratamiento y seguridad. Asegúrese de hacer y acudir a todas las citas, y llame a luevano médico si está teniendo problemas. También es maximo buena idea saber los resultados de los exámenes y mantener maximo lista de los medicamentos que blanca. Cómo puede cuidarse en el hogar? Cuide luevano cuerpo después del parto · Utilice toallas sanitarias en vez de tampones para las pérdidas de taniya que podrían durar hasta 2 semanas. · Alivie los cólicos con ibuprofeno (Advil, Motrin). · Alivie el dolor de las hemorroides y la marito entre la vagina y el recto con compresas de hielo o de infusión de hamamelis Ladona Wyatt (\"witch hazel\"). · Alivie el estreñimiento bebiendo abundante líquido y comiendo alimentos ricos en fibra. Pregúntele a luevano médico acerca de los ablandadores de heces de [employer]. · Límpiese con un chorrito suave de agua tibia de maximo botella en vez de hacerlo con papel higiénico. 
· [place] un baño de asiento en agua tibia varias veces al día. · Use un buen sostén de lactancia. Alivie el dolor y la hinchazón de los senos con toallitas de aseo húmedas tibias. · Si no está amamantando, utilice hielo en lugar de calor para aliviar el dolor de los senos. · Si está amamantando, luevano período menstrual podría no comenzar hasta después de varios meses. Es posible que Laureano, y más tiempo al principio, de kenyatta lo hacía antes del Gianna. · Espere hasta que haya sanado (de 4 a 6 semanas) antes de volver a tener relaciones sexuales. Luevano médico le dirá cuándo puede tener relaciones sexuales. · Trate de no viajar con el bebé zoraida las primeras 5 o 6 semanas. Si hace un viaje brock en automóvil, dasha paradas frecuentes para caminar y estirarse. Evite el agotamiento · Descanse todos los días. Trate de dormir la siesta cuando luevano bebé también lo hace. · Pídale a otro adulto que la acompañe por unos lulú después del [place]. · Planifique el cuidado de los niños si tiene otros hijos. · Sea flexible para que pueda comer a horas fuera de lo común y dormir cuando lo necesite. Tanto usted kenyatta luevano bebé están creando horarios nuevos. · Planee pequeñas salidas para estar afuera de la casa. El cambio podría hacer que se sienta menos fatigada. · Pida ayuda para cocinar y [address] hogar y las compras. Recuerde que luevano principal tarea consiste en cuidar a luevano bebé. Conozca la ayuda que puede recibir en kristan de tener depresión posparto · La depresión posparto es común zoraida las primeras 1 a 2 semanas siguientes al nacimiento. Podría llorar o sentirse rafat o irritable sin razón alguna. · Descanse cada vez que pueda hacerlo. Estar fatigada le dificulta manejar lucita emociones. · Salga a caminar con luevano bebé. · Hable con luevano andi, lucita amigos y lucita familiares acerca de lucita sentimientos. · Si lucita síntomas marcus más de unas pocas semanas, o si se siente muy deprimida, pídale ayuda a luevano médico. 
· La depresión posparto puede tratarse. Los grupos de apoyo y la asesoría psicológica pueden ser de [place]. A veces, los medicamentos también pueden ayudar. Manténgase saludable · Coma alimentos sanos para tener más energía, producir maximo buena [place] materna y adelgazar las libras adicionales que engordó con el bebé. · Si amamanta, evite el alcohol y las drogas. No fume. Si dejó de fumar zoraida el embarazo, felicitaciones. · Inicie ejercicios diarios después de 4 a 6 semanas, mikayla descanse cuando se sienta fatigada. · Aprenda ejercicios para tonificar el abdomen.  Dasha los ejercicios de [place] para recuperar la fuerza de los músculos pélvicos. Puede hacer los ejercicios de [place] mientras está de pie o sentada. ¨ Apriete los mismos músculos que usted usaría para detener la [place]. El abdomen y los muslos no deben moverse. ¨ Manténgalos apretados zoraida 3 segundos y, luego, relájelos otros 3 segundos. ¨ Empiece con 3 segundos. Neldon Ricardo, añada 1 mony cada semana hasta que sea capaz de apretar zoraida 10 segundos. ¨ Repita el ejercicio entre 10 y 13 veces cada sesión. Dasha nat o más sesiones cada día. · Busque maximo clase para nuevas madres y recién nacidos que tenga un tiempo de ejercicio. · Si le alcaraz hecho maximo cesárea, dese un poco más de tiempo antes de hacer ejercicio, y tenga cuidado. Cuándo debe pedir ayuda? Llame al 911 en cualquier momento que considere que necesita atención de [place]. Por ejemplo, llame si: 
? · Se desmayó (perdió el conocimiento). ?Llame a bernardo médico ahora mismo o busque atención médica inmediata si: 
? · Tiene sangrado vaginal intenso. Trail significa que está expulsando coágulos sanguíneos y empapando maximo toalla sanitaria cada hora por 2 horas o más. ? · Está mareada o aturdida, o siente kenyatta que se puede [address] [facility] 15. ? · Tiene fiebre. ? · Tiene dolor abdominal nuevo o que empeora. ?Preste especial atención a los cambios en bernardo cecilio y asegúrese de comunicarse con bernardo médico si: 
? · Bernardo sangrado vaginal parece volverse más intenso. ? · Tiene flujo vaginal nuevo o que empeora. ? · Se siente rafat, ansiosa o sin esperanzas zoraida más de unos pocos días. ? · No mejora kenyatta se esperaba. Dónde puede encontrar más información en inglés? Wilma Ramos a [email or web address] Criss Eller F096 en la búsqueda para aprender más acerca de \"Después del parto (período de posparto): Instrucciones de cuidado - [ After Your Delivery (the Postpartum Period): Care Instructions ]. \" 
Revisado: 16 marzo, 2017 Versión del contenido: 11.4 © 1423-5837 Healthwise, Incorporated. Las instrucciones de cuidado fueron adaptadas bajo licencia por [employer] (which disclaims liability or warranty for this information). Si usted tiene [place] afección médica o sobre estas instrucciones, siempre pregunte a luevano profesional de cecilio. Healthwise, Incorporated niega toda garantía o responsabilidad por luevano uso de esta información. DISCHARGE SUMMARY from Nurse PATIENT INSTRUCTIONS: 
 
After general anesthesia or intravenous sedation, for 24 hours or while taking prescription Narcotics: · Limit your activities · Do not drive and operate hazardous machinery · Do not make important personal or business decisions · Do  not drink alcoholic beverages · If you have not urinated within 8 hours after discharge, please contact your surgeon on call. Report the following to your surgeon: 
· Excessive pain, swelling, redness or odor of or around the surgical area · Temperature over 100.5 · Nausea and vomiting lasting longer than 4 hours or if unable to take medications · Any signs of decreased circulation or nerve impairment to extremity: change in color, persistent  numbness, tingling, coldness or increase pain · Any questions What to do at Home: 
 
Discharge instruction to follow: Activity: Pelvis rest for 6 weeks, Nothing in vagina for 6 weeks No heavy lifting over 15 lbs for 2 weeks No driving for 2 weeks No push/pull motion such as sweeping or vacuuming for 2 weeks No tub baths or swimming for 6 weeks  section keep incision clean and dry, may shower as normal with soap and water. Inspect incision every day for signs of infection listed below. Continue to use coretta-bottle with every void or bowel movement until comfortable stopping. Change sanitary pad after each urination or bowel movement.  
 
Call MD for the following: 
    Fever over 101 F; pain not relieved by medication; foul smelling vaginal discharge or increase in vaginal bleeding. Redness, swelling, or drainage from  incision. Take medication as prescribed. Follow up with MD as order. *  Please give a list of your current medications to your Primary Care Provider. *  Please update this list whenever your medications are discontinued, doses are 
    changed, or new medications (including over-the-counter products) are added. *  Please carry medication information at all times in case of emergency situations. These are general instructions for a healthy lifestyle: No smoking/ No tobacco products/ Avoid exposure to second hand smoke Surgeon General's Warning:  Quitting smoking now greatly reduces serious risk to your health. Obesity, smoking, and sedentary lifestyle greatly increases your risk for illness A healthy diet, regular physical exercise & weight monitoring are important for maintaining a healthy lifestyle You may be retaining fluid if you have a history of heart failure or if you experience any of the following symptoms:  Weight gain of 3 pounds or more overnight or 5 pounds in a week, increased swelling in our hands or feet or shortness of breath while lying flat in bed. Please call your doctor as soon as you notice any of these symptoms; do not wait until your next office visit. Recognize signs and symptoms of STROKE: 
 
F-face looks uneven A-arms unable to move or move unevenly S-speech slurred or non-existent T-time-call 911 as soon as signs and symptoms begin-DO NOT go Back to bed or wait to see if you get better-TIME IS BRAIN. Warning Signs of HEART ATTACK Call 911 if you have these symptoms: 
? Chest discomfort. Most heart attacks involve discomfort in the center of the chest that lasts more than a few minutes, or that goes away and comes back. It can feel like uncomfortable pressure, squeezing, fullness, or pain. ? Discomfort in other areas of the upper body. Symptoms can include pain or discomfort in one or both arms, the back, neck, jaw, or stomach. ? Shortness of breath with or without chest discomfort. ? Other signs may include breaking out in a cold sweat, nausea, or lightheadedness. Don't wait more than five minutes to call [address]! Fast action can save your life. Calling 911 is almost always the fastest way to get lifesaving treatment. Emergency Medical Services staff can begin treatment when they arrive  up to an hour sooner than if someone gets to the hospital by car. The discharge information has been reviewed with the patient. The patient verbalized understanding. Discharge medications reviewed with the patient and appropriate educational materials and side effects teaching were provided. ___________________________________________________________________________________________________________________________________ INSTRUCCIONES PARA EL PACIENTE: 
 
Después de anestesia general o sedación intravenosa, zoraida 24 horas o mientras blanca narcóticos recetados: 
Limite lucita actividades No maneje ni opere maquinaria peligrosa No tomes decisiones personales o comerciales importantes No bebas bebidas alcohólicas Si no ha [employer] 8 horas posteriores al sara, comuníquese con luevano cirujano de [place]. Informe lo siguiente a luevano cirujano: 
Dolor excesivo, hinchazón, enrojecimiento u [employer] de o alrededor del Ming Bicker Temperatura sobre 100.5 Náuseas y vómitos que marcus más de 4 horas o si no puede rinku [employer] Cualquier signo de disminución de la circulación o deterioro nervioso a las extremidades: cambio de color, entumecimiento persistente, hormigueo, frío o aumento del dolor Alguna pregunta 
 
Qué hacer en casa: Instrucciones de descarga a seguir: 
Actividad: descanso de la pelvis zoraida 6 semanas, nada en la vagina zoraida 6 semanas Sin levantar objetos pesados ? ?de más de 15 lbs por 2 semanas No conducir zoraida 2 semanas Sin movimiento de empuje / tracción, kenyatta barrer o pasar la aspiradora zoraida 2 semanas [place] de bañeras ni natación zoraida 6 semanas La sección cesárea mantiene la incisión limpia y [place] Pafos, puede ducharse normalmente con agua y [place]. Inspeccione la incisión todos los días para detectar signos de infección enumerados a continuación. Continúe usando coretta-bottle con cada espacio vacío o evacuación intestinal hasta que se detenga cómodamente. Cambie la compresa sanitaria después de cada micción o defecación. Llame a MD para lo siguiente: 
[employer] de 80 F; dolor que no se marshal con medicamentos; flujo vaginal maloliente o aumento en el sangrado vaginal. Enrojecimiento, hinchazón o drenaje de maximo incisión cesárea. [place] la medicación según lo prescrito. Norrine Li un seguimiento con MD Saida Revels. * Proporcione maximo lista de lucita medicamentos actuales a luevano Proveedor de [employer] * Actualice esta lista siempre que lucita medicamentos se suspendan, las dosis son 
[place], o se agregan nuevos medicamentos (incluidos los productos de [place]). * Lleve la información del medicamento en todo momento en kristan de situaciones de emergencia. Estas son instrucciones generales para un estilo de jalil saludable: No fumar / No fumar productos / Angela Kocher exposición al humo de segunda mano Advertencia del cirujano general: dejar de fumar ahora reduce mucho el riesgo grave para luevano cecilio. La obesidad, el tabaquismo y el estilo de jalil sedentario [place] en gran medida el riesgo de enfermedad Marge Chatters saludable, ejercicio físico regular y control de peso son importantes para mantener un estilo de jalil saludable Puede estar reteniendo líquido si tiene antecedentes de insuficiencia cardíaca o si experimenta alguno de los siguientes síntomas: aumento de peso de 3 libras o más zoraida la noche o 5 libras en maximo semana, aumento de la hinchazón en las yonathan o los pies o dificultad para respirar mientras acostado en la cama. Llame a luevano médico tan pronto kenyatta note alguno de estos síntomas; no esperes hasta tu próxima visita a la oficina. Reconozca los signos y síntomas de STROKE: 
 
F-face se ve desigual 
 
A: brazos incapaces de moverse o moverse de forma desigual 
 
S-speech arrastrado o inexistente T-time-call 911 tan pronto kenyatta comiencen los signos y síntomas-DO NOT go 
    Vuelva a la cama o espere a felisa si mejora. EL TIEMPO ES CEREBRO. Señales de advertencia de ataque al corazón Llame al 911 si tiene estos síntomas: 
Molestias en el pecho. La mayoría de los ataques al corazón implican molestias en el centro del pecho que marcus más de unos minutos, o que desaparecen y [place]. Puede sentirse kenyatta maximo presión incómoda, exprimido, lleno o dolor. Molestias en otras áreas de la parte superior del cuerpo. Los síntomas pueden incluir dolor o incomodidad en susie o ambos brazos, la espalda, el jacque, la mandíbula o el BJURHOLM. Falta de aliento con o sin molestias en el pecho. Otros signos pueden incluir un sudor frío, náuseas o aturdimiento. ¡No espere más de carrillo minutos para llamar al 911 - MINUTES MATE! La acción rápida puede salvarle la jalil. Llamar al 911 es tracie siempre la forma más rápida de obtener un tratamiento que salve vidas. El personal de los [employer] de Emergencia puede comenzar el tratamiento cuando llegan, hasta maximo hora antes que si alguien llega al hospital en automóvil. La información de sara ha sido revisada con el paciente. El paciente verbalizó la comprensión. Se revisaron los medicamentos de sara con el paciente y se proporcionaron los materiales educativos apropiados y la enseñanza de los efectos secundarios.  
___________________________________________________________________________ ________________________________________________________ Introducing [facility]! Manuel Alanis introduce portal paciente MyChart . Ahora se puede acceder a partes de luevano expediente médico, enviar por correo electrónico la oficina de luevano médico y solicitar renovaciones de medicamentos en línea. En luevano navegador de Internet , Brenna Valente a [email or web address] Bernie clic en el usuario por Delmy Mancilla? Jarad Kilgore clic aquí en la sesión Aki Haagensen. Verá la página de registro [place]. Ingrese luevano código de [employer] masood y kenyatta aparece a continuación. Usted no tendrá que [employer] código después de brent completado el proceso de registro . Si usted no se inscribe antes de la fecha de caducidad , debe solicitar un nuevo código. · MyChart Código de acceso : [id number] Expires: 7/3/2018  4:45 PM 
 
Ingresa los últimos cuatro dígitos de luevano Número de Seguro Social ( xxxx ) y fecha de nacimiento ( dd / mm / aaaa ) kenyatta se indica y bernie clic en Enviar. Usted será llevado a la siguiente página de registro . Crear un ID MyChart . Esta será luevano ID de inicio de sesión de MyChart y no puede ser [place] , por lo que pensar en maximo que es Orie Deann y fácil de recordar . Crear maximo contraseña MyChart . Usted puede cambiar luevano contraseña en cualquier momento . Ingrese luevano Password Reset de preguntas y Lau . Otterbein se puede utilizar en un momento posterior si usted olvida luevano contraseña. Introduzca luevano dirección de correo electrónico . Zeinab Mauricio recibirá maximo notificación por correo electrónico cuando la nueva información está disponible en MyChart . Damon fowler en Registrarse. Lahardeep Dun felisa y descargar porciones de luevano expediente médico. 
Bernie janeth en el enlace de descarga del menú Resumen para descargar maximo copia portátil de luevano información médica . Si tiene [employer] , por favor visite la sección de preguntas frecuentes del sitio web MyChart .  Recuerde, MyChart NO es que se utilizará para las [employer]. Para emergencias médicas , eula al 911 . Ahora disponible en luevano iPhone y Android ! Introducing Abhi Martínez As a [facility] patient, I wanted to make you aware of our electronic visit tool called Abhi Shaikhfin. [facility] 24/7 allows you to connect within minutes with a medical provider 24 hours a day, seven days a week via a mobile device or tablet or logging into a secure website from your computer. You can access Abhi Martínez from anywhere in the [place]. A virtual visit might be right for you when you have a simple condition and feel like you just dont want to get out of bed, or cant get away from work for an appointment, when your regular [facility] provider is not available (evenings, weekends or holidays), or when youre out of town and need minor care. Electronic visits cost only $49 and if the [facility] 24/7 provider determines a prescription is needed to treat your condition, one can be electronically transmitted to a nearby pharmacy*. Please take a moment to enroll today if you have not already done so. The enrollment process is free and takes just a few minutes. To enroll, please download the [facility] 24/7 diana to your tablet or phone, or visit [email or web address] to enroll on your computer. And, as an [address] [place] [address] patient with a [employer] account, the results of your visits will be scanned into your electronic medical record and your primary care provider will be able to view the scanned results. We urge you to continue to see your regular [facility] provider for your ongoing medical care. And while your primary care provider may not be the one available when you seek a Abhi Martínez virtual visit, the peace of mind you get from getting a real diagnosis real time can be priceless.    
 
For more information on Abhi Bassettkaylafin, view our Frequently Asked Questions (FAQs) at [email or web address]. Sincerely, 
 
Moise Mcdaniels MD 
Chief Medical Officer [employer] *:  certain medications cannot be prescribed via Abhi Martínez Unresulted tests-please follow up with your PCP on these results Procedure/Test Authorizing Provider CBC W/O ROBERT Calvo MD  
 CBC WITH AUTOMATED DIFF MD Odessa Friedman MD  
 RT--CORD BLOOD GAS Billie Calvo MD  
  
Providers Seen During Your Hospitalization Provider Specialty Primary office phone Billie Calvo MD Obstetrics & Gynecology [phone] Immunizations Administered for This Admission Name Date Tdap 6/14/2018 Your Primary Care Physician (PCP) Primary Care Physician Office Phone Office Fax NONE ** None ** ** None ** You are allergic to the following No active allergies Recent Documentation Breastfeeding? OB Status Smoking Status Yes Recent pregnancy Never Smoker Emergency Contacts Name Discharge Info Relation Home Work Mobile Gustabo Yeh  Boyfrienisabelle [17] [phone] Patient Belongings The following personal items are in your possession at time of discharge: 
  Dental Appliances: None  Visual Aid: Glasses      Home Medications: None   Jewelry: None  Clothing: Footwear, Undergarments, Shirt, Shorts    Other Valuables: None Please provide this summary of care documentation to your next provider. Signatures-by signing, you are acknowledging that this After Visit Summary has been reviewed with you and you have received a copy. Patient Signature:  ____________________________________________________________ Date:  ____________________________________________________________  
  
[facility]  Provider Signature: ____________________________________________________________ Date:  ____________________________________________________________  
  
  
   
  
Bharti Collado [address] [facility] 
[phone] Patient: Irasema Messina MRN: [id number] KFR: Sobre bernardo hospitalización Le admitieron el:  2018 Bernardo tratamiento más reciente fue el:  SFE 4 MOTHER INFANT Le dieron de sara el:  2018 Por qué le ingresaron Bernardo diagnosis primaria es:  S/P  Section Bernardo diagnosis también incluye:  Normal Labor Follow-up Information Follow up With Details Comments Contact Info Billie Calvo MD On 2018 at 10:15 am [address] [place] [address]  [address] 
[phone] None   None ([phone]) Patient stated that they have no PCP Billie Calvo MD  2018 at 10:15 am [address] [place] [address]  [address] 
[phone] Your Scheduled Appointments 2018 10:10 AM EDT Global Post Op with Billie Calvo MD  
[facility]) [address]  [address]  
[phone] Discharge Orders Abacast A check manan indicates which time of day the medication should be taken. My Medications ABIODUN a rinku [employer] Instructions Each Dose to Equal  
 Morning Noon Evening Bedtime  
 ibuprofen 800 mg tablet También conocido kenyatta:  MOTRIN Your last dose was: Your next dose is: Take 1 Tab by mouth every six (6) hours as needed. 800 mg  
    
   
   
   
  
 oxyCODONE-acetaminophen 5-325 mg per tablet También conocido kenyatta:  PERCOCET Your last dose was: Your next dose is: Take 1-2 Tabs by mouth every four (4) hours as needed. Max Daily Amount: 12 Tabs. 1-2 Tab SIGA tomando estos medicamentos  Instructions Each Dose to Equal  
 Morning Noon Evening Bedtime  
 ferrous sulfate 325 mg (65 mg iron) EC tablet Seferinosolange huntley kenyatta:  IRON Your last dose was: Your next dose is: Take 1 Tab by mouth daily. Indications: Iron Deficiency Anemia 325 mg  
    
   
   
   
  
 prenatal vit-iron fumarate-fa 27 mg iron- 0.8 mg Tab tablet Your last dose was: Your next dose is: Take 1 Tab by mouth daily. Indications: pregnancy 1 Tab Dónde puede recoger lucita medicamentos Estos medicamentos fueron enviados a [facility] [id number] - [place], [address] AT [facility] [address] [address] Teléfono:  [phone]  
  ibuprofen 800 mg tablet Information on where to get these meds will be given to you by the nurse or doctor. ! Pregunte a luevano médico o enfermero/a sobre estos medicamentos  
  oxyCODONE-acetaminophen 5-325 mg per tablet Opioid Education Prescription Opioids: What You Need to Know: 
 
Prescription opioids can be used to help relieve moderate-to-severe pain and are often prescribed following a surgery or injury, or for certain health conditions. These medications can be an important part of treatment but also come with serious risks. Opioids are strong pain medicines. Examples include hydrocodone, oxycodone, fentanyl, and morphine. Heroin is an example of an illegal opioid. It is important to work with your health care provider to make sure you are getting the safest, most effective care. WHAT ARE THE RISKS AND SIDE EFFECTS OF OPIOID USE? Prescription opioids carry serious risks of addiction and overdose, especially with prolonged use. An opioid overdose, often marked by slow breathing, can cause sudden death. The use of prescription opioids can have a number of side effects as well, even when taken as directed. · Tolerance-meaning you might need to take more of a medication for the same pain relief · Physical dependence-meaning you have symptoms of withdrawal when the medication is stopped. Withdrawal symptoms can include nausea, sweating, chills, diarrhea, stomach cramps, and muscle aches. Withdrawal can last up to several weeks, depending on which drug you took and how long you took it. · Increased sensitivity to pain · Constipation · Nausea, vomiting, and dry mouth · Sleepiness and dizziness · Confusion · Depression · Low levels of testosterone that can result in lower sex drive, energy, and strength · Itching and sweating RISKS ARE GREATER WITH:      
· History of drug misuse, substance use disorder, or overdose · Mental health conditions (such as depression or anxiety) · Sleep apnea · Older age (72 years or older) · Pregnancy Avoid alcohol while taking prescription opioids. Also, unless specifically advised by your health care provider, medications to avoid include: · Benzodiazepines (such as Xanax or Valium) · Muscle relaxants (such as Soma or Flexeril) · Hypnotics (such as Ambien or Lunesta) · Other prescription opioids KNOW YOUR OPTIONS Talk to your health care provider about ways to manage your pain that don't involve prescription opioids. Some of these options may actually work better and have fewer risks and side effects. Options may include: 
· Pain relievers such as acetaminophen, ibuprofen, and naproxen · Some medications that are also used for depression or seizures · Physical therapy and exercise · Counseling to help patients learn how to cope better with triggers of pain and stress. · Application of heat or cold compress · Massage therapy · Relaxation techniques Be Informed Make sure you know the name of your medication, how much and how often to take it, and its potential risks & side effects.  
 
IF YOU ARE PRESCRIBED OPIOIDS FOR PAIN: 
 · Never take opioids in greater amounts or more often than prescribed. Remember the goal is not to be pain-free but to manage your pain at a tolerable level. · Follow up with your primary care provider to: · Work together to create a plan on how to manage your pain. · Talk about ways to help manage your pain that don't involve prescription opioids. · Talk about any and all concerns and side effects. · Help prevent misuse and abuse. · Never sell or share prescription opioids · Help prevent misuse and abuse. · Store prescription opioids in a secure place and out of reach of others (this may include visitors, children, friends, and family). · Safely dispose of unused/unwanted prescription opioids: Find your community drug take-back program or your pharmacy mail-back program, or flush them down the toilet, following guidance from the Food and Drug Administration (www.fda.gov/Drugs/ResourcesForYou). · Visit www.cdc.gov/drugoverdose to learn about the risks of opioid abuse and overdose. · If you believe you may be struggling with addiction, tell your health care provider and ask for guidance or call [employer] at 3-810-189-CNAG. Instrucciones a luis de sara Después del parto (período de posparto): Instrucciones de cuidado - [ After Your Delivery (the Postpartum Period): Care Instructions ] Instrucciones de cuidado Felicidades por el nacimiento de luevano bebé. Al igual que el John Quintero, el tiempo con el recién nacido puede ser un momento de [place], [place] y agotamiento. Es posible que se sienta serrano al mirar la sulema de luevano pequeño bebé. También podría sentirse abrumada por luevano nuevo ritmo de sueño y las nuevas responsabilidades. Al principio, los bebés suelen dormir zoraida el día y permanecen despiertos zoraida la noche. No tienen ningún patrón ni rutina.  Podrían luis gritos ahogados, sacudirse y despertarse, o parecer kenyatta si tuvieran los ojos cruzados (bizcos). Todo esto es normal, e incluso la pueden hacer sonreír. Zoraida las primeras semanas siguientes al parto, trate de cuidarse nataliia. Podría tardar de 4 a 6 semanas en volver a sentirse usted misma, y posiblemente más tiempo si le alcaraz hecho maximo cesárea. Es probable que se sienta muy fatigada zoraida varias semanas. Viviana días estarán llenos de [place], mikayla también de mucha alegría. La atención de seguimiento es maximo parte clave de luevano tratamiento y seguridad. Asegúrese de hacer y acudir a todas las citas, y llame a luevano médico si está teniendo problemas. También es maximo buena idea saber los resultados de los exámenes y mantener maximo lista de los medicamentos que blanca. Cómo puede cuidarse en el hogar? Cuide luevano cuerpo después del parto · Utilice toallas sanitarias en vez de tampones para las pérdidas de taniya que podrían durar hasta 2 semanas. · Alivie los cólicos con ibuprofeno (Advil, Motrin). · Alivie el dolor de las hemorroides y la marito entre la vagina y el recto con compresas de hielo o de infusión de hamamelis Michelleisai Lopez (\"witch hazel\"). · Alivie el estreñimiento bebiendo abundante líquido y comiendo alimentos ricos en fibra. Pregúntele a luevano médico acerca de los ablandadores de heces de [employer]. · Límpiese con un chorrito suave de agua tibia de maximo botella en vez de hacerlo con papel higiénico. 
· [place] un baño de asiento en agua tibia varias veces al día. · Use un buen sostén de lactancia. Alivie el dolor y la hinchazón de los senos con toallitas de aseo húmedas tibias. · Si no está amamantando, utilice hielo en lugar de calor para aliviar el dolor de los senos. · Si está amamantando, luevano período menstrual podría no comenzar hasta después de varios meses. Es posible que Laureano, y más tiempo al principio, de kenyatta lo hacía antes del Gianna. · Espere hasta que haya sanado (de 4 a 6 semanas) antes de volver a tener relaciones sexuales. Luevano médico le dirá cuándo puede tener relaciones sexuales. · Trate de no viajar con el bebé zoraida las primeras 5 o 6 semanas. Si hace un viaje brock en automóvil, bernie paradas frecuentes para caminar y estirarse. Evite el agotamiento · Descanse todos los días. Trate de dormir la siesta cuando luevano bebé también lo hace. · Pídale a otro adulto que la acompañe por unos lulú después del [place]. · Planifique el cuidado de los niños si tiene otros hijos. · Sea flexible para que pueda comer a horas fuera de lo común y dormir cuando lo necesite. Tanto usted kenyatta luevano bebé están creando horarios nuevos. · Planee pequeñas salidas para estar afuera de la casa. El cambio podría hacer que se sienta menos fatigada. · Pida ayuda para cocinar y [address] [facility] y las compras. Recuerde que luevano principal tarea consiste en cuidar a luevano bebé. Conozca la ayuda que puede recibir en kristan de tener depresión posparto · La depresión posparto es común zoraida las primeras 1 a 2 semanas siguientes al nacimiento. Podría llorar o sentirse rafat o irritable sin razón alguna. · Descanse cada vez que pueda hacerlo. Estar fatigada le dificulta manejar lcuita emociones. · Salga a caminar con luevano bebé. · Hable con luevano andi, lucita amigos y lucita familiares acerca de lucita sentimientos. · Si lucita síntomas marcus más de unas pocas semanas, o si se siente muy deprimida, pídale ayuda a luevano médico. 
· La depresión posparto puede tratarse. Los grupos de apoyo y la asesoría psicológica pueden ser de [facility]. A veces, los medicamentos también pueden ayudar. Manténgase saludable · Coma alimentos sanos para tener más energía, producir maximo buena [place] materna y adelgazar las libras adicionales que engordó con el bebé. · Si amamanta, evite el alcohol y las drogas. No fume. Si dejó de fumar zoraida el embarazo, felicitaciones. · Inicie ejercicios diarios después de 4 a 6 semanas, mikayla descanse cuando se sienta fatigada. · Aprenda ejercicios para tonificar el abdomen. Dasha los ejercicios de Kegel para recuperar la fuerza de los músculos pélvicos. Puede hacer los ejercicios de Kegel mientras está de pie o sentada. ¨ Apriete los mismos músculos que usted usaría para detener la [place]. El abdomen y los muslos no deben moverse. ¨ Manténgalos apretados zoraida 3 segundos y, luego, relájelos otros 3 segundos. ¨ Empiece con 3 segundos. Tracie Crawford, añada 1 mony cada semana hasta que sea capaz de apretar zoraida 10 segundos. ¨ Repita el ejercicio entre 10 y 13 veces cada sesión. Dasha nat o más sesiones cada día. · Busque maximo clase para nuevas madres y recién nacidos que tenga un tiempo de ejercicio. · Si le alcaraz hecho maximo cesárea, dese un poco más de tiempo antes de hacer ejercicio, y tenga cuidado. Cuándo debe pedir ayuda? Llame al 911 en cualquier momento que considere que necesita atención de [place]. Por ejemplo, llame si: 
? · Se desmayó (perdió el conocimiento). ?Llame a bernardo médico ahora mismo o busque atención médica inmediata si: 
? · Tiene sangrado vaginal intenso. [place] significa que está expulsando coágulos sanguíneos y empapando maximo toalla sanitaria cada hora por 2 horas o más. ? · Está mareada o aturdida, o siente kenyatta que se puede [address]. ? · Tiene fiebre. ? · Tiene dolor abdominal nuevo o que empeora. ?Preste especial atención a los cambios en bernardo cecilio y asegúrese de comunicarse con bernardo médico si: 
? · Bernardo sangrado vaginal parece volverse más intenso. ? · Tiene flujo vaginal nuevo o que empeora. ? · Se siente rafat, ansiosa o sin esperanzas zoraida más de unos pocos días. ? · No mejora kenyatta se esperaba. Dónde puede encontrar más información en inglés? Sreedhar Waldron a [email or web address]  
Kaden Harrellache B063 en la búsqueda para aprender más acerca de \"Después del parto (período de posparto): Instrucciones de cuidado - [ After Your Delivery (the Postpartum Period): Care Instructions ]. \" 
Revisado: 2017 Versión del contenido: 11.4 © 4868-0840 Healthwise, Incorporated. Las instrucciones de cuidado fueron adaptadas bajo licencia por [employer] (which disclaims liability or warranty for this information). Si usted tiene [place] afección médica o sobre estas instrucciones, siempre pregunte a luevano profesional de cecilio. Healthwise, Incorporated niega toda garantía o responsabilidad por luevano uso de esta información. DISCHARGE SUMMARY from Nurse PATIENT INSTRUCTIONS: 
 
After general anesthesia or intravenous sedation, for 24 hours or while taking prescription Narcotics: · Limit your activities · Do not drive and operate hazardous machinery · Do not make important personal or business decisions · Do  not drink alcoholic beverages · If you have not urinated within 8 hours after discharge, please contact your surgeon on call. Report the following to your surgeon: 
· Excessive pain, swelling, redness or odor of or around the surgical area · Temperature over 100.5 · Nausea and vomiting lasting longer than 4 hours or if unable to take medications · Any signs of decreased circulation or nerve impairment to extremity: change in color, persistent  numbness, tingling, coldness or increase pain · Any questions What to do at Home: 
 
Discharge instruction to follow: Activity: Pelvis rest for 6 weeks, Nothing in vagina for 6 weeks No heavy lifting over 15 lbs for 2 weeks No driving for 2 weeks No push/pull motion such as sweeping or vacuuming for 2 weeks No tub baths or swimming for 6 weeks  section keep incision clean and dry, may shower as normal with soap and water. Inspect incision every day for signs of infection listed below.  
Continue to use coretta-bottle with every void or bowel movement until comfortable stopping. Change sanitary pad after each urination or bowel movement. Call MD for the following: 
    Fever over 101 F; pain not relieved by medication; foul smelling vaginal discharge or increase in vaginal bleeding. Redness, swelling, or drainage from  incision. Take medication as prescribed. Follow up with MD as order. *  Please give a list of your current medications to your Primary Care Provider. *  Please update this list whenever your medications are discontinued, doses are 
    changed, or new medications (including over-the-counter products) are added. *  Please carry medication information at all times in case of emergency situations. These are general instructions for a healthy lifestyle: No smoking/ No tobacco products/ Avoid exposure to second hand smoke Surgeon General's Warning:  Quitting smoking now greatly reduces serious risk to your health. Obesity, smoking, and sedentary lifestyle greatly increases your risk for illness A healthy diet, regular physical exercise & weight monitoring are important for maintaining a healthy lifestyle You may be retaining fluid if you have a history of heart failure or if you experience any of the following symptoms:  Weight gain of 3 pounds or more overnight or 5 pounds in a week, increased swelling in our hands or feet or shortness of breath while lying flat in bed. Please call your doctor as soon as you notice any of these symptoms; do not wait until your next office visit. Recognize signs and symptoms of STROKE: 
 
F-face looks uneven A-arms unable to move or move unevenly S-speech slurred or non-existent T-time-call 911 as soon as signs and symptoms begin-DO NOT go Back to bed or wait to see if you get better-TIME IS BRAIN. Warning Signs of HEART ATTACK Call 911 if you have these symptoms: 
? Chest discomfort.  Most heart attacks involve discomfort in the center of the chest that lasts more than a few minutes, or that goes away and comes back. It can feel like uncomfortable pressure, squeezing, fullness, or pain. ? Discomfort in other areas of the upper body. Symptoms can include pain or discomfort in one or both arms, the back, neck, jaw, or stomach. ? Shortness of breath with or without chest discomfort. ? Other signs may include breaking out in a cold sweat, nausea, or lightheadedness. Don't wait more than five minutes to call [address]! Fast action can save your life. Calling 911 is almost always the fastest way to get lifesaving treatment. Emergency Medical Services staff can begin treatment when they arrive  up to an hour sooner than if someone gets to the hospital by car. The discharge information has been reviewed with the patient. The patient verbalized understanding. Discharge medications reviewed with the patient and appropriate educational materials and side effects teaching were provided. ___________________________________________________________________________________________________________________________________ INSTRUCCIONES PARA EL PACIENTE: 
 
Después de anestesia general o sedación intravenosa, zoraida 24 horas o mientras blanca narcóticos recetados: 
Limite lucita actividades No maneje ni opere maquinaria peligrosa No tomes decisiones personales o comerciales importantes No bebas bebidas alcohólicas Si no ha [employer] 8 horas posteriores al sara, comuníquese con luevano cirujano de [place]. Informe lo siguiente a luevano cirujano: 
Dolor excesivo, hinchazón, enrojecimiento u [employer] de o alrededor del [facility] Naseem Temperatura sobre 100.5 Náuseas y vómitos que marcus más de 4 horas o si no puede rinku [employer] Cualquier signo de disminución de la circulación o deterioro nervioso a las extremidades: cambio de color, entumecimiento persistente, hormigueo, frío o aumento del dolor [employer] Monica Nix en casa: Instrucciones de descarga a seguir: 
Actividad: descanso de la pelvis zoraida 6 semanas, nada en la vagina zoraida 6 semanas Sin levantar objetos pesados ? ?de más de 15 lbs por 2 semanas No conducir zoraida 2 semanas Sin movimiento de empuje / tracción, kenyatta barrer o pasar la aspiradora zoraida 2 semanas [place] de bañeras ni natación zoraida 6 semanas La sección cesárea mantiene la incisión limpia y [place] Pafos, puede ducharse normalmente con agua y Igor. Inspeccione la incisión todos los días para detectar signos de infección enumerados a continuación. Continúe usando coretta-bottle con cada espacio vacío o evacuación intestinal hasta que se detenga cómodamente. Cambie la compresa sanitaria después de cada micción o defecación. Llame a MD para lo siguiente: 
Omnicare de 80 F; dolor que no se marshal con medicamentos; flujo vaginal maloliente o aumento en el sangrado vaginal. Enrojecimiento, hinchazón o drenaje de maximo incisión cesárea. [place] la medicación según lo prescrito. Divine [place] un seguimiento con MD Derryl Mile. * Proporcione maximo lista de lucita medicamentos actuales a luevano [employer] * Actualice esta lista siempre que lucita medicamentos se suspendan, las dosis son 
[place], o se agregan nuevos medicamentos (incluidos los productos de [employer]). * Lleve la información del medicamento en todo momento en kristan de situaciones de emergencia. Estas son instrucciones generales para un estilo de jalil saludable: No fumar / No fumar productos / Berta Peeling exposición al humo de segunda mano Advertencia del cirujano general: dejar de fumar ahora reduce mucho el riesgo grave para luevano cecilio. La obesidad, el tabaquismo y el estilo de jalil sedentario [place] en gran medida el riesgo de enfermedad Broderick [place] saludable, ejercicio físico regular y control de peso son importantes para mantener un estilo de jalil saludable Puede estar reteniendo líquido si tiene antecedentes de insuficiencia cardíaca o si experimenta alguno de los siguientes síntomas: aumento de peso de 3 libras o más zoraida la noche o 5 libras en maximo semana, aumento de la hinchazón en las yonathan o los pies o dificultad para respirar mientras acostado en la cama. Llame a luevano médico tan pronto kenyatta note alguno de estos síntomas; no esperes hasta tu próxima visita a la oficina. Reconozca los signos y síntomas de STROKE: 
 
F-face se ve desigual 
 
A: brazos incapaces de moverse o moverse de forma desigual 
 
S-speech arrastrado o inexistente T-time-call 911 tan pronto kenyatta comiencen los signos y síntomas-DO NOT go 
    Vuelva a la cama o espere a felisa si mejora. EL TIEMPO ES CEREBRO. Señales de advertencia de ataque al corazón Llame al 911 si tiene estos síntomas: 
Molestias en el pecho. La mayoría de los ataques al corazón implican molestias en el centro del pecho que marcus más de unos minutos, o que desaparecen y [place]. Puede sentirse kenyatta maximo presión incómoda, exprimido, lleno o dolor. Molestias en otras áreas de la parte superior del cuerpo. Los síntomas pueden incluir dolor o incomodidad en susie o ambos brazos, la espalda, el jacque, la mandíbula o el BJURHOLM. Falta de aliento con o sin molestias en el pecho. Otros signos pueden incluir un sudor frío, náuseas o aturdimiento. ¡No espere más de carrillo minutos para llamar al 911 - MINUTES MATE! La acción rápida puede salvarle la jalil. Llamar al 911 es tracie siempre la forma más rápida de obtener un tratamiento que salve vidas. El personal de los [facility] puede comenzar el tratamiento cuando llegan, hasta maximo hora antes que si alguien llega al hospital en automóvil. La información de sara ha sido revisada con el paciente. El paciente verbalizó la comprensión.  
Se revisaron los medicamentos de sara con el paciente y se proporcionaron los materiales educativos apropiados y la enseñanza de los efectos secundarios. ___________________________________________________________________________________________________________________________________ Introducing [facility]! Manuel Alanis introduce portal paciente MyChart . Ahora se puede acceder a partes de luevano expediente médico, enviar por correo electrónico la oficina de luevano médico y solicitar renovaciones de medicamentos en línea. En luevano navegador de Internet , Guillermo Yip a [email or web address] Bernie clic en el usuario por Margarite [place]? Yuan Kimball clic aquí en la sesión Alecia Due. Verá la página de registro [place]. Ingrese luevano código de [employer] masood y kenyatta aparece a continuación. Usted no tendrá que [employer] código después de brent completado el proceso de registro . Si usted no se inscribe antes de la fecha de caducidad , debe solicitar un nuevo código. · MyChart Código de acceso : [id number] Expires: 7/3/2018  4:45 PM 
 
Ingresa los últimos cuatro dígitos de luevano Número de Seguro Social ( xxxx ) y fecha de nacimiento ( dd / mm / aaaa ) kenyatta se indica y bernie clic en Enviar. Usted será llevado a la siguiente página de registro . Crear un ID MyChart . Esta será luevano ID de inicio de sesión de MyChart y no puede ser [place] , por lo que pensar en maximo que es Shasta Wiley y fácil de recordar . Crear maximo contraseña MyChart . Usted puede cambiar luevano contraseña en cualquier momento . Ingrese luevano Password Reset de preguntas y Lau . Vera se puede utilizar en un momento posterior si usted olvida luevano contraseña. Introduzca luevano dirección de correo electrónico . Ziggy Sigala recibirá maximo notificación por correo electrónico cuando la nueva información está disponible en MyChart . Taina Rail janeth en Registrarse.  Carla Bundy felisa y descargar porciones de luevano expediente Gorge fowler en el enlace de descarga del menú Resumen para descargar João Mendiola copia portátil de luevano información médica . Si tiene [employer] , por favor visite la sección de preguntas frecuentes del sitio web MyChart . Recuerde, MyChart NO es que se utilizará para las necesidades urgentes. Para emergencias médicas , llame al 911 . Ahora disponible en luevano iPhone y Android ! Introducing Abhi Martínez As a [employer] patient, I wanted to make you aware of our electronic visit tool called Abhi Martínez. [employer] 24/7 allows you to connect within minutes with a medical provider 24 hours a day, seven days a week via a mobile device or tablet or logging into a secure website from your computer. You can access Abhi Martínez from anywhere in the [place]. A virtual visit might be right for you when you have a simple condition and feel like you just dont want to get out of bed, or cant get away from work for an appointment, when your regular [employer] provider is not available (evenings, weekends or holidays), or when youre out of town and need minor care. Electronic visits cost only $49 and if the [employer] 24/7 provider determines a prescription is needed to treat your condition, one can be electronically transmitted to a nearby pharmacy*. Please take a moment to enroll today if you have not already done so. The enrollment process is free and takes just a few minutes. To enroll, please download the [employer] 24/7 diana to your tablet or phone, or visit [email or web address] to enroll on your computer. And, as an [address] [place] [address] patient with a [email or web address] account, the results of your visits will be scanned into your electronic medical record and your primary care provider will be able to view the scanned results. We urge you to continue to see your regular [employer] provider for your ongoing medical care.   And while your primary care provider may not be the one available when you seek a Abhi Martínez virtual visit, the peace of mind you get from getting a real diagnosis real time can be priceless. For more information on Abhi Bassettkaylafin, view our Frequently Asked Questions (FAQs) at [email or web address]. Sincerely, 
 
Dereje Stephens MD 
Chief Medical Officer Diane Cedillo *:  certain medications cannot be prescribed via Abhi Martínez Unresulted tests-please follow up with your PCP on these results Procedimiento/Prueba Autorizada por CBC W/O DIFF Yvette Engel MD  
 CBC WITH AUTOMATED DIFF MD Dilshad Schroeder MD  
 RT--CORD BLOOD GAS Yvette Engel MD  
  
Providers Seen During Your Hospitalization Personal Médico Especialidad Teléfono principal de la oficina Yvette Engel MD Obstetrics & Gynecology [phone] Agueda Vincent Administradas en esta admisión:    
   
 Zita Damico Tdap 6/14/2018 Bernardo médico de atención primaria (PCP ) Primary Care Physician Office Phone Office Fax NONE ** None ** ** None ** Tiene alergias a lo siguiente No tiene alergias Documentación recientes Está amamantando? Estado obstétrico Estatus de tabaquísmo Yes Recent pregnancy Never Smoker Emergency Contacts Name Discharge Info Relation Home Work Mobile Gustabo Yeh [[id number]] [phone] Patient Belongings The following personal items are in your possession at time of discharge: 
  Dental Appliances: None  Visual Aid: Glasses      Home Medications: None   Jewelry: None  Clothing: Footwear, Undergarments, Shirt, Shorts    Other Valuables: None Please provide this summary of care documentation to your next provider.  
  
  
 
  
Signatures-by signing, you are acknowledging that this After Visit Summary has been reviewed with you and you have received a copy. Patient Signature:  ____________________________________________________________ Date:  ____________________________________________________________  
  
Dahiana Lambing Provider Signature:  ____________________________________________________________ Date:  ____________________________________________________________

## 2018-06-11 NOTE — PROGRESS NOTES
Consents signed. Unable to start IV. Lab in to draw labs and BB bracelet applied. CRNA at bedside. IV started.

## 2018-06-11 NOTE — L&D DELIVERY NOTE
Delivery Summary    Patient: Cynthia Carbajal MRN: [id number]  SSN: [id number]    YOB: 1990  Age: 32 y.o. Sex: female        Information for the patient's :  Justice Dexter [[id number]]       Labor Events:    Labor: No   Rupture Date:     Rupture Time:     Rupture Type     Amniotic Fluid Volume: Moderate    Amniotic Fluid Description:       Induction: None       Augmentation: None   Labor Events: None     Cervical Ripening:     None     Delivery Events:  Episiotomy:     Laceration(s):       Repaired:      Number of Repair Packets:     Suture Type and Size:       Estimated Blood Loss (ml): 900.00ml       Delivery Date: 2018    Delivery Time: 3:56 PM  Delivery Type: , Low Transverse   Details    Trial of Labor: No   Primary/Repeat: Repeat   Priority: Routine   Indications:      Incision type:     Sex:  Male     Gestational Age: 38w7d  Delivery Clinician:  Malinda Beck  Living Status: Living   Delivery Location: OR #2          APGARS  One minute Five minutes Ten minutes   Skin color: 1   1        Heart rate: 2   2        Grimace: 2   2        Muscle tone: 2   2        Breathin   2        Totals: 9   9          Presentation: Vertex    Position:        Resuscitation Method:  Suctioning-bulb; Tactile Stimulation     Meconium Stained: None      Cord Vessels: 3 Vessels      Cord Events:    Cord Blood Sent?:  Yes    Blood Gases Sent?:  Yes    Placenta:  Date/Time:   3:56 PM  Removal: Expressed      Appearance: Normal;Intact     [place] Measurements:  Birth Weight: 6 lb 9.1 oz (2.98 kg)      Birth Length: 48 cm      Head Circumference: 31.5 cm      Chest Circumference: 33 cm     Abdominal Girth:       Other Providers:   MALINDA BECK;MIREYA ROSARIO;CAMILO MCLEOD;DAYDAY DANG;DAVID RODRIGUEZ JONATHAN R, Obstetrician;Primary Nurse;Primary  Nurse;Respiratory Therapist;Neonatologist;Anesthesiologist             Group B Strep:   Lab Results Component Value Date/Time    GrBStrep, External Negative 2018     Information for the patient's :  Lety Jay [[id number]]     Lab Results   Component Value Date/Time    ABO/Rh(D) A POSITIVE 2018 03:56 PM    PATRICIO IgG NEG 2018 03:56 PM     Lab Results   Component Value Date/Time    APH 7.296 2018 03:56 PM    APCO2 54 (H) 2018 03:56 PM    APO2 17 2018 03:56 PM    AHCO3 26 2018 03:56 PM    ABDC 1.7 2018 03:56 PM    EPHV 7.334 2018 03:56 PM    PCO2V 47 (H) 2018 03:56 PM    PO2V 22 (L) 2018 03:56 PM    HCO3V 25 2018 03:56 PM    EBDV 1.7 (L) 2018 03:56 PM    SITE CORD 2018 03:56 PM    SITE CORD 2018 03:56 PM    RSCOM na at 2018 4 24 30 PM. Not read back. 2018 03:56 PM    RSCOM na at 2018 4 24 47 PM. Not read back.  2018 03:56 PM

## 2018-06-11 NOTE — ANESTHESIA PROCEDURE NOTES
Spinal Block    Performed by: Silvia Espitia  Authorized by: Silvia Espitia     Pre-procedure:   Indications: primary anesthetic  Preanesthetic Checklist: patient identified, risks and benefits discussed, anesthesia consent, patient being monitored and timeout performed    Timeout Time: 15:32          Spinal Block:   Patient Position:  Seated  Prep Region:  Lumbar  Prep: chlorhexidine and patient draped      Location:  L3-4  Technique:  Single shot  Local:  Lidocaine 1%  Local Dose (mL):  3    Needle:   Needle Type:  Pencan  Needle Gauge:  25 G  Attempts:  1      Events: CSF confirmed, no blood with aspiration and no paresthesia        Assessment:  Insertion:  Uncomplicated  Patient tolerance:  Patient tolerated the procedure well with no immediate complications

## 2018-06-11 NOTE — PROGRESS NOTES
SBAR OUT Report: Mother    Verbal report given to Madi Smart RN on this patient, who is now being transferred to MIU for routine progression of care. The patient is wearing a green \"Anesthesia-Duramorph\" band. Report consisted of patient's Situation, Background, Assessment and Recommendations (SBAR).  ID bands were compared with the identification form, and verified with the patient and receiving nurse. Information from the SBAR and the [address] [facility] Report was reviewed with the receiving nurse; opportunity for questions and clarification provided.

## 2018-06-11 NOTE — H&P
History & Physical    Name: Alen Ramirez MRN: [id number]  SSN: [id number]    YOB: 1990  Age: 32 y.o. Sex: female      Subjective:     Estimated Date of Delivery: 18  OB History    Para Term  AB Living   2 1 1   1   SAB TAB Ectopic Molar Multiple Live Births        1      # Outcome Date GA Lbr Jason/2nd Weight Sex Delivery Anes PTL Lv   2 Current            1 Term    8 lb 5 oz (3.771 kg) M CS-Unspec EPIDURAL AN  RIZWANA          Ms. Ricardo Villa admitted with pregnancy at 38w6d for  section due to previous  section and labor. Prenatal course was normal. Please see prenatal records for details. Past Medical History:   Diagnosis Date    Anemia      Past Surgical History:   Procedure Laterality Date     DELIVERY ONLY       Social History     Occupational History    Not on file. Social History Main Topics    Smoking status: Never Smoker    Smokeless tobacco: Never Used    Alcohol use No    Drug use: No    Sexual activity: Yes     Partners: Male     Family History   Problem Relation Age of Onset    Hypertension Mother        No Known Allergies  Prior to Admission medications    Medication Sig Start Date End Date Taking? Authorizing Provider   ferrous sulfate (IRON) 325 mg (65 mg iron) EC tablet Take 1 Tab by mouth daily. Indications: Iron Deficiency Anemia 18   Keenan Cornejo MD   prenatal vit-iron fumarate-fa 27 mg iron- 0.8 mg tab tablet Take 1 Tab by mouth daily. Indications: pregnancy 17   Keenan Cornejo MD        Review of Systems: Pertinent items are noted in the History of Present Illness. Objective:     Vitals:  Vitals:    18 1404   BP: 109/71   Pulse: 68   Resp: 18   Temp: 97.7 °F (36.5 °C)        Physical Exam:  Patient with distress.   Heart: Regular rate and rhythm  Lung: clear to auscultation throughout lung fields, no wheezes, no rales, no rhonchi and normal respiratory effort  Back: costovertebral angle tenderness absent  Abdomen: soft, nontender  Fundus: soft and non tender  Perineum: blood absent, amniotic fluid absent  Cervical Exam: 2 cm dilated    50% effaced    -1 station    Lower Extremities:  - Edema 1+   - No evidence of DVT seen on physical exam.  Membranes:  Intact      Prenatal Labs:   Lab Results   Component Value Date/Time    Rubella, External Immune 10/24/2017    GrBStrep, External Negative 2018    HBsAg, External NEG 10/24/2017    HIV, External NR 10/24/2017    RPR, External NR 10/24/2017    Gonorrhea, External NEG 2017    Chlamydia, External NEG 2017    ABO,Rh O Positive 10/28/2017         Impression/Plan:     A: IUP at term, previous CS in labor    Plan:  Admit for  section. Group B Strep was negative. Discussed the risks of surgery including the risks of bleeding, infection, deep vein thrombosis, and surgical injuries to internal organs including but not limited to the bowels, bladder, rectum, and female reproductive organs. The patient understands the risks; any and all questions were answered to the patient's satisfaction.     Signed By:  Yvette Engel MD     2018

## 2018-06-11 NOTE — ANESTHESIA POSTPROCEDURE EVALUATION
Post-Anesthesia Evaluation and Assessment    Patient: Amanda Kat MRN: [id number]  SSN: [id number]    YOB: 1990  Age: 32 y.o. Sex: female       Cardiovascular Function/Vital Signs  Visit Vitals    /62 (BP 1 Location: Right arm, BP Patient Position: Sitting)    Pulse 68    Temp 36.5 °C (97.7 °F)    Resp 18    SpO2 99%    Breastfeeding Yes       Patient is status post spinal anesthesia for Procedure(s):   SECTION. Nausea/Vomiting: None    Postoperative hydration reviewed and adequate. Pain:  Pain Scale 1: Numeric (0 - 10) (18)  Pain Intensity 1: 0 (18)   Managed    Neurological Status:   Neuro (WDL): Within Defined Limits (18)   At baseline    Mental Status and Level of Consciousness: Awake. Pulmonary Status:   O2 Device: Room air (18)   Adequate oxygenation and airway patent    Complications related to anesthesia: None    Post-anesthesia assessment completed.  No concerns    Signed By: Greg Estes MD     2018

## 2018-06-11 NOTE — ANESTHESIA PREPROCEDURE EVALUATION
Anesthetic History   No history of anesthetic complications            Review of Systems / Medical History  Pertinent labs reviewed    Pulmonary  Within defined limits                 Neuro/Psych   Within defined limits           Cardiovascular  Within defined limits                Exercise tolerance: >4 METS     GI/Hepatic/Renal  Within defined limits              Endo/Other        Anemia     Other Findings              Physical Exam    Airway  Mallampati: II  TM Distance: 4 - 6 cm  Neck ROM: normal range of motion   Mouth opening: Normal     Cardiovascular  Regular rate and rhythm,  S1 and S2 normal,  no murmur, click, rub, or gallop             Dental  No notable dental hx       Pulmonary  Breath sounds clear to auscultation               Abdominal  GI exam deferred       Other Findings            Anesthetic Plan    ASA: 2  Anesthesia type: spinal            Anesthetic plan and risks discussed with: Patient and Spouse       present.

## 2018-06-11 NOTE — OP NOTES
Section Delivery Procedure Note         Name: Alina Mclean      Medical Record Number: [id number]      YOB: 1990     Today's Date: 2018      Preoperative Diagnosis: TAI 2018 Repeat  Section    Postoperative Diagnosis: Same as previous Viable male apgars 9&9    Procedure: Low Cervical Transverse    SECTION    Surgeon(s):  Yvette Engel MD    Anesthesia:  spinal    Prophylactic Antibiotics: Ancef 2 gm    EBL: 900 ml         Fetal Description: baca male    Birth Information: Information for the patient's :  Dago Rojas [[id number]]   One Minute Apgar: 9 (Filed from Delivery Summary)  Five  Minute Apgar: 9 (Filed from Delivery Summary)      Umbilical Cord: 3 vessels present and Cord blood sent to lab for type, Rh, and Mike' test    Placenta:  Expressed  intact    Specimens:   ID Type Source Tests Collected by Time Destination   1 : placenta Placenta Placenta  Yvette Engel MD 2018 [id number] Discarded               Complications:  none    Procedure Details: The patient was taken to the operating room, where spinal anesthesia was administered and found to be adequate. Rosario catheter had been placed using sterile technique. The patient was prepped and draped in the normal sterile fashion. The abdomen was entered using the Pfannenstiel technique. The peritoneum was entered sharply well superior to the bladder. The bladder blade was then inserted. The vesicouterine and peritoneum was identified and entered sharply with Metzenbaum scissors. The bladder flap was then created sharply and the bladder blade was reinserted. A low transverse uterine incision was made with the scalpel and extended laterally with blunt finger dissection. Amniotomy was performed and the fluid was medium amount clear. The babys head was then delivered atraumatically. The nose and mouth were suctioned.  The cord was clamped and cut and the baby was handed off to the waiting  care unit staff. Placenta was then expressed from the uterus. The uterus was exteriorized and cleared of all clots and debris. The uterine incision was closed with number 1 Chromic in running locking fashion followed by an imbricating stitch with good hemostasis assured. The posterior cul-de-sac was irrigated with warm normal saline. Good hemostasis was again reassured and the uterus was returned to the abdomen. The anterior pelvis was irrigated with warm normal saline and good hemostasis was again reassured throughout. The anterior peritoneum was closed with 3-0 Vicryl. The fascia was closed with 0 Vicrly in a running fashion. Good hemostasis was assured. The subcuticular layers were reapproximated with 2-0 plain gut. The skin was closed with a 4-0 Vicryl in a subcuticular fashion. Steri-strips were applied. The patient tolerated the procedure well. Sponge, lap, and needle counts were correct times three and the patient and baby were taken to recovery/postpartum room in stable condition.     SignedNick Stearns MD      2018

## 2018-06-12 LAB
BASOPHILS # BLD: 0 K/UL (ref 0–0.2)
BASOPHILS NFR BLD: 0 % (ref 0–2)
DIFFERENTIAL METHOD BLD: ABNORMAL
EOSINOPHIL # BLD: 0.1 K/UL (ref 0–0.8)
EOSINOPHIL NFR BLD: 1 % (ref 0.5–7.8)
ERYTHROCYTE [DISTWIDTH] IN BLOOD BY AUTOMATED COUNT: 15.2 % (ref 11.9–14.6)
HCT VFR BLD AUTO: 32.1 % (ref 35.8–46.3)
HGB BLD-MCNC: 10.4 G/DL (ref 11.7–15.4)
IMM GRANULOCYTES # BLD: 0.1 K/UL (ref 0–0.5)
IMM GRANULOCYTES NFR BLD AUTO: 0 % (ref 0–5)
LYMPHOCYTES # BLD: 1.8 K/UL (ref 0.5–4.6)
LYMPHOCYTES NFR BLD: 14 % (ref 13–44)
MCH RBC QN AUTO: 27.5 PG (ref 26.1–32.9)
MCHC RBC AUTO-ENTMCNC: 32.4 G/DL (ref 31.4–35)
MCV RBC AUTO: 84.9 FL (ref 79.6–97.8)
MONOCYTES # BLD: 1.2 K/UL (ref 0.1–1.3)
MONOCYTES NFR BLD: 9 % (ref 4–12)
NEUTS SEG # BLD: 10.2 K/UL (ref 1.7–8.2)
NEUTS SEG NFR BLD: 76 % (ref 43–78)
PLATELET # BLD AUTO: 190 K/UL (ref 150–450)
PMV BLD AUTO: 11.5 FL (ref 10.8–14.1)
RBC # BLD AUTO: 3.78 M/UL (ref 4.05–5.25)
WBC # BLD AUTO: 13.3 K/UL (ref 4.3–11.1)

## 2018-06-12 PROCEDURE — 85025 COMPLETE CBC W/AUTO DIFF WBC: CPT | Performed by: OBSTETRICS & GYNECOLOGY

## 2018-06-12 PROCEDURE — 74011250636 HC RX REV CODE- 250/636: Performed by: ANESTHESIOLOGY

## 2018-06-12 PROCEDURE — 74011250637 HC RX REV CODE- 250/637: Performed by: ANESTHESIOLOGY

## 2018-06-12 PROCEDURE — 65270000029 HC RM PRIVATE

## 2018-06-12 PROCEDURE — 74011000250 HC RX REV CODE- 250: Performed by: ANESTHESIOLOGY

## 2018-06-12 PROCEDURE — 36415 COLL VENOUS BLD VENIPUNCTURE: CPT | Performed by: OBSTETRICS & GYNECOLOGY

## 2018-06-12 PROCEDURE — 74011250637 HC RX REV CODE- 250/637: Performed by: OBSTETRICS & GYNECOLOGY

## 2018-06-12 PROCEDURE — 74011250636 HC RX REV CODE- 250/636: Performed by: OBSTETRICS & GYNECOLOGY

## 2018-06-12 RX ORDER — OXYCODONE AND ACETAMINOPHEN 7.5; 325 MG/1; MG/1
2 TABLET ORAL
Status: DISCONTINUED | OUTPATIENT
Start: 2018-06-12 | End: 2018-06-13

## 2018-06-12 RX ORDER — NALOXONE HYDROCHLORIDE 0.4 MG/ML
0.4 INJECTION, SOLUTION INTRAMUSCULAR; INTRAVENOUS; SUBCUTANEOUS AS NEEDED
Status: DISCONTINUED | OUTPATIENT
Start: 2018-06-12 | End: 2018-06-14 | Stop reason: HOSPADM

## 2018-06-12 RX ORDER — DIPHENHYDRAMINE HCL 25 MG
25 CAPSULE ORAL
Status: DISCONTINUED | OUTPATIENT
Start: 2018-06-12 | End: 2018-06-14 | Stop reason: HOSPADM

## 2018-06-12 RX ORDER — ONDANSETRON 8 MG/1
8 TABLET, ORALLY DISINTEGRATING ORAL
Status: DISCONTINUED | OUTPATIENT
Start: 2018-06-12 | End: 2018-06-14 | Stop reason: HOSPADM

## 2018-06-12 RX ORDER — DIPHENHYDRAMINE HYDROCHLORIDE 50 MG/ML
12.5 INJECTION, SOLUTION INTRAMUSCULAR; INTRAVENOUS ONCE
Status: COMPLETED | OUTPATIENT
Start: 2018-06-12 | End: 2018-06-12

## 2018-06-12 RX ORDER — HYDROCODONE BITARTRATE AND ACETAMINOPHEN 7.5; 325 MG/1; MG/1
1 TABLET ORAL
Status: DISCONTINUED | OUTPATIENT
Start: 2018-06-12 | End: 2018-06-13

## 2018-06-12 RX ORDER — IBUPROFEN 800 MG/1
800 TABLET ORAL
Status: DISCONTINUED | OUTPATIENT
Start: 2018-06-12 | End: 2018-06-14 | Stop reason: HOSPADM

## 2018-06-12 RX ADMIN — OXYCODONE HYDROCHLORIDE 5 MG: 5 TABLET ORAL at 06:21

## 2018-06-12 RX ADMIN — SIMETHICONE CHEW TAB 80 MG 80 MG: 80 TABLET ORAL at 08:02

## 2018-06-12 RX ADMIN — ACETAMINOPHEN 1000 MG: 500 TABLET, FILM COATED ORAL at 04:07

## 2018-06-12 RX ADMIN — OXYCODONE HYDROCHLORIDE 5 MG: 5 TABLET ORAL at 02:07

## 2018-06-12 RX ADMIN — Medication 2 G: at 06:21

## 2018-06-12 RX ADMIN — IBUPROFEN 800 MG: 800 TABLET ORAL at 19:53

## 2018-06-12 RX ADMIN — OXYCODONE HYDROCHLORIDE AND ACETAMINOPHEN 2 TABLET: 7.5; 325 TABLET ORAL at 19:53

## 2018-06-12 RX ADMIN — SENNOSIDES AND DOCUSATE SODIUM 2 TABLET: 8.6; 5 TABLET ORAL at 08:02

## 2018-06-12 RX ADMIN — Medication 2 G: at 13:59

## 2018-06-12 RX ADMIN — KETOROLAC TROMETHAMINE 30 MG: 30 INJECTION, SOLUTION INTRAMUSCULAR at 06:21

## 2018-06-12 RX ADMIN — SIMETHICONE CHEW TAB 80 MG 80 MG: 80 TABLET ORAL at 19:53

## 2018-06-12 RX ADMIN — ACETAMINOPHEN 1000 MG: 500 TABLET, FILM COATED ORAL at 11:40

## 2018-06-12 RX ADMIN — KETOROLAC TROMETHAMINE 30 MG: 30 INJECTION, SOLUTION INTRAMUSCULAR at 13:59

## 2018-06-12 RX ADMIN — PRENATAL VIT W/ FE FUMARATE-FA TAB 27-0.8 MG 1 TABLET: 27-0.8 TAB at 08:02

## 2018-06-12 RX ADMIN — PROMETHAZINE HYDROCHLORIDE 6.25 MG: 25 INJECTION INTRAMUSCULAR; INTRAVENOUS at 09:40

## 2018-06-12 RX ADMIN — SIMETHICONE CHEW TAB 80 MG 80 MG: 80 TABLET ORAL at 11:41

## 2018-06-12 RX ADMIN — NALBUPHINE HYDROCHLORIDE 5 MG: 20 INJECTION, SOLUTION INTRAMUSCULAR; INTRAVENOUS; SUBCUTANEOUS at 02:08

## 2018-06-12 RX ADMIN — DIPHENHYDRAMINE HYDROCHLORIDE 25 MG: 25 CAPSULE ORAL at 21:42

## 2018-06-12 RX ADMIN — KETOROLAC TROMETHAMINE 30 MG: 30 INJECTION, SOLUTION INTRAMUSCULAR at 00:39

## 2018-06-12 RX ADMIN — DIPHENHYDRAMINE HYDROCHLORIDE 12.5 MG: 50 INJECTION, SOLUTION INTRAMUSCULAR; INTRAVENOUS at 12:09

## 2018-06-12 RX ADMIN — NALBUPHINE HYDROCHLORIDE 5 MG: 20 INJECTION, SOLUTION INTRAMUSCULAR; INTRAVENOUS; SUBCUTANEOUS at 08:01

## 2018-06-12 RX ADMIN — OXYCODONE HYDROCHLORIDE 5 MG: 5 TABLET ORAL at 10:16

## 2018-06-12 NOTE — PROGRESS NOTES
Pt requests pain medication for verbalized pain level of 5/10. 1,000mg Tylenol and 5mg Roxicodone administered PO. RN to reassess.

## 2018-06-12 NOTE — PROGRESS NOTES
Pt calls out and requests pain medication for verbalized pain level of 9/10. 1mg Dilaudid administered slow IV push. Pt repositioned in bed with pillows. Mary Kate care provided and pad changed. Fundus firm with scant bleeding. Lights dimmed and cool washcloth provided. RN to reassess.

## 2018-06-12 NOTE — LACTATION NOTE
This note was copied from a baby's chart. First visit with 2nd time mom.  Karlene Schneider present for entire visit.  1st child, 6 yrs old, x9 months. Mom unsure of last feeding time. Infant showing some feeding cues in bassinet. Undressed infant. Suck assessment WNL. Mom attempted to latch baby to left breast in cradle hold, infant unable to latch. Assisted mom with positioning and attempt on left breast in cross cradle hold. Infant latched well and sucked a few times but too sleepy. Tried multiple times and then placed infant skin to skin with mom. Reviewed expectations for first 24 hours of life. Encouraged to feed on demand, waking for feeds if needed. Encouraged at least 8 feedings in 24 hours. Reviewed expected output. Mom states she plans to breast and bottle feed formula. Plans to breastfeed at night and formula feed during the day once she returns to work. Reviewed no medical need at this time for formula supplementation. Encouraged insurance pumping if formula supplementation given. Mom verbalized understanding. Mom to call for lactation at next feeding attempt. Dressed and swaddled infant per mother's request. Lactation to return for feeding observation when mom calls.

## 2018-06-12 NOTE — PROGRESS NOTES
Pt requests pain medication and something for itching. 5mg Roxicodone administered PO. 5mg Nubain administered slow IV push. RN to reassess.

## 2018-06-12 NOTE — LACTATION NOTE
This note was copied from a baby's chart. First visit with breastfeeding mom and  Bairon Flowers. Discussed feeding baby on cue. Opening mouth, turning head from side to side, bringing hands to mouth and sucking movements are all early hunger cues. Crying is a late hunger cue. Do lots of skin to skin to help recognize early feeding cues. If baby does not nurse, continue skin to skin and offer again later. On average newborns eat at least 8 or more times per day without restriction. Cluster feeding is normal.  Reviewed positioning techniques and signs of a good latch. Discussed holding baby so that ear, shoulder and hip are in a straight line. Baby is held close and nose lines up with mom's nipple. Discussed supporting baby's neck and mom's breast.  When baby opens wide bring baby quickly onto the breast.  Try for an assymetrical latch with nipple pointed towards the roof of baby's mouth. Mouth should be wide and lips flanged around the breast.  Encouraged to nurse on the first breast until baby finishes that side. If baby comes off the breast quickly, you can re-offer that same side to ensure good stimulation before moving baby to next side. Offer the second breast, baby can take the second breast as long as desired. It is ok if they do not take the second side when offered. Listen and look for swallows. Once milk is in over the next few days, swallowing will become more frequent and obvious. If baby pausing on the breast longer than 10 seconds, remind baby to nurse. Attempt to burp between breasts and after feeding. Rotate which breast the baby starts on. Discussed expected output based on age. Discussed feed on demand. If necessary rouse for feedings at least until above birth weight. Reviewed Breastfeeding Packet and encouraged mom to write down feedings and output at least until first Ped visit.   In accordance with the 4513 AAP Policy Statement on Breastfeeding, Mothers of healthy term  infants should be delay pacifier use until breastfeeding is well-established, usually about 3 to 4 wk after birth. Pacifiers are not available on the Mother Infant Unit. Artificial nipples should also be avoided until breastfeeding is well established.

## 2018-06-12 NOTE — PROGRESS NOTES
Patient assisted up to bathroom. Unable to void. Peppermint oil placed in hat.   Patient demonstrated understanding of coretta care

## 2018-06-12 NOTE — PROGRESS NOTES
[place] to room/unit and plan of care though out night including ambulation at 12 hours, infant feedings, pain control with  present

## 2018-06-12 NOTE — PROGRESS NOTES
Admission assessment complete at this time. Pt resting quietly in bed with and denies further needs at this time. Encouraged to call out with any questions or concerns and she verbalizes understanding.

## 2018-06-12 NOTE — ROUTINE PROCESS
SBAR IN Report: Mother    Verbal report received from [address] [place] [address] on this patient, who is now being transferred from L&D for routine post - op. The patient is wearing a green \"Anesthesia-Duramorph\" band. Report consisted of patient's Situation, Background, Assessment and Recommendations (SBAR).  ID bands were compared with the identification form, and verified with the patient and transferring nurse. Information from the Intake/Output, MAR and Recent Results and the [place] Report was reviewed with the transferring nurse; opportunity for questions and clarification provided.

## 2018-06-12 NOTE — PROGRESS NOTES
, Kylie Romero, at bedside. Patient states nausea has improved. Patient rates pain 6/10. Told patient with assistance from  that once patient is more awake when will attempt to get her up to void again. Patient voiced understanding.

## 2018-06-12 NOTE — PROGRESS NOTES
present for assessment by Lexx Benz RN and lactation consult by ROSIE Betancur, [email or web address]  c: [address]  [place] [address] / [place], [address] [facility]  [email or web address]

## 2018-06-12 NOTE — LACTATION NOTE
Mom called for feeding observation. Mom latched baby to left breast in cross cradle hold. Good latch noted. Infant fed x15 min. Infant came off, nipple round on release. Infant burped and mom placed infant on right breast in cross cradle hold. Infant latched and nursed well x15 min. Burped, swaddled, and placed infant in bassinet at mother's request. Mother with questions about supplementing formula, called for .

## 2018-06-12 NOTE — LACTATION NOTE
Stacy,  to bedside. Questions answered regarding formula supplementation. No medical indication at this time. Mom would like to supplement as she plans to breast and bottle feed infant. Encouraged mom to breastfeed first and then supplement after as desired. Reviewed insurance pumping x10 min after breastfeeding, if supplementing, to help increase stimulation and milk supply. Mom in agreement. Pump and pump kit provided with full instructions for use, collection, and cleaning. Mom verbalized understanding. Mom requests to pump at later time. Encouraged to call for assistance if needed.

## 2018-06-12 NOTE — PROGRESS NOTES
Anesthesiology  Post-op Note    Post-op day 1 s/p  via spinal with neuraxial opioids for post-op pain management. Visit Vitals    /64 (BP 1 Location: Right arm, BP Patient Position: At rest)    Pulse 66    Temp 37.1 °C (98.7 °F)    Resp 18    LMP 2017    SpO2 99%    Breastfeeding Yes     Airway patent, patient appropriately hydrated and appears euvolemic. Patient is Alert and oriented. Pain is well controlled. Pruritus is moderately controlled. Nausea is absent. No complaints about back or site of injection. Motor and sensory function has returned to baseline in lower extremities. Patient is satisfied with anesthetic and reports no complications. Continue current orders, then initiate surgeon's orders for pain management 24 hours after . Follow up per surgeon.

## 2018-06-12 NOTE — PROGRESS NOTES
Post-Operative Day Number 1 Progress Note    Patient doing well post-op day 1 from  delivery with c/o itching all over. Pain controlled on current medication. Voiding without difficulty, normal lochia. Vitals:  Patient Vitals for the past 8 hrs:   BP Temp Pulse Resp   18 1446 120/72 98 °F (36.7 °C) 85 18   18 1000 116/69 98.1 °F (36.7 °C) 69 18     Temp (24hrs), Av.9 °F (36.6 °C), Min:97.4 °F (36.3 °C), Max:98.7 °F (37.1 °C)      Vital signs stable, afebrile. Exam:  Patient without distress. Abdomen soft, fundus firm at level of umbilicus, non tender. Incision dry and clean without erythema. Lower extremities are negative for swelling, cords or tenderness. Assessment and Plan:  Patient appears to be having uncomplicated post- course. Continue routine post-op care and maternal education.

## 2018-06-12 NOTE — PROGRESS NOTES
Rosario catheter removed. 1400 ml of clear yellow urine in bag. Assisted pt up to restroom. Taught pt pericare using peribottle. Pt returned demonstrated teaching. Pt's bed linens changed. Pt ambulated back to bed unassisted. Pt denies needs.

## 2018-06-13 PROCEDURE — 65270000029 HC RM PRIVATE

## 2018-06-13 PROCEDURE — 74011250637 HC RX REV CODE- 250/637: Performed by: OBSTETRICS & GYNECOLOGY

## 2018-06-13 RX ORDER — OXYCODONE AND ACETAMINOPHEN 5; 325 MG/1; MG/1
1-2 TABLET ORAL
Status: DISCONTINUED | OUTPATIENT
Start: 2018-06-13 | End: 2018-06-14 | Stop reason: HOSPADM

## 2018-06-13 RX ORDER — OXYCODONE AND ACETAMINOPHEN 5; 325 MG/1; MG/1
1-2 TABLET ORAL
Qty: 40 TAB | Refills: 0 | Status: SHIPPED | OUTPATIENT
Start: 2018-06-13 | End: 2018-06-22

## 2018-06-13 RX ORDER — IBUPROFEN 800 MG/1
800 TABLET ORAL
Qty: 30 TAB | Refills: 1 | Status: SHIPPED | OUTPATIENT
Start: 2018-06-13 | End: 2018-06-22

## 2018-06-13 RX ORDER — HYDROCODONE BITARTRATE AND ACETAMINOPHEN 7.5; 325 MG/1; MG/1
1 TABLET ORAL
Qty: 30 TAB | Refills: 0 | Status: SHIPPED | OUTPATIENT
Start: 2018-06-13 | End: 2018-06-13

## 2018-06-13 RX ADMIN — IBUPROFEN 800 MG: 800 TABLET ORAL at 03:58

## 2018-06-13 RX ADMIN — SIMETHICONE CHEW TAB 80 MG 80 MG: 80 TABLET ORAL at 11:42

## 2018-06-13 RX ADMIN — SIMETHICONE CHEW TAB 80 MG 80 MG: 80 TABLET ORAL at 21:15

## 2018-06-13 RX ADMIN — OXYCODONE HYDROCHLORIDE AND ACETAMINOPHEN 2 TABLET: 5; 325 TABLET ORAL at 14:53

## 2018-06-13 RX ADMIN — IBUPROFEN 800 MG: 800 TABLET ORAL at 11:42

## 2018-06-13 RX ADMIN — IBUPROFEN 800 MG: 800 TABLET ORAL at 18:31

## 2018-06-13 RX ADMIN — OXYCODONE HYDROCHLORIDE AND ACETAMINOPHEN 2 TABLET: 5; 325 TABLET ORAL at 18:31

## 2018-06-13 RX ADMIN — OXYCODONE HYDROCHLORIDE AND ACETAMINOPHEN 2 TABLET: 7.5; 325 TABLET ORAL at 00:52

## 2018-06-13 RX ADMIN — OXYCODONE HYDROCHLORIDE AND ACETAMINOPHEN 2 TABLET: 5; 325 TABLET ORAL at 09:24

## 2018-06-13 RX ADMIN — OXYCODONE HYDROCHLORIDE AND ACETAMINOPHEN 2 TABLET: 5; 325 TABLET ORAL at 22:28

## 2018-06-13 RX ADMIN — SIMETHICONE CHEW TAB 80 MG 80 MG: 80 TABLET ORAL at 14:54

## 2018-06-13 RX ADMIN — PRENATAL VIT W/ FE FUMARATE-FA TAB 27-0.8 MG 1 TABLET: 27-0.8 TAB at 09:24

## 2018-06-13 RX ADMIN — SIMETHICONE CHEW TAB 80 MG 80 MG: 80 TABLET ORAL at 09:25

## 2018-06-13 RX ADMIN — DIPHENHYDRAMINE HYDROCHLORIDE 25 MG: 25 CAPSULE ORAL at 09:24

## 2018-06-13 RX ADMIN — DIPHENHYDRAMINE HYDROCHLORIDE 25 MG: 25 CAPSULE ORAL at 21:21

## 2018-06-13 RX ADMIN — SENNOSIDES AND DOCUSATE SODIUM 2 TABLET: 8.6; 5 TABLET ORAL at 09:24

## 2018-06-13 NOTE — PROGRESS NOTES
Patient complaining of dizziness. Checked vital signs with blood pressure slightly lower than previous values. Will recheck again. Advised patient through a phone  diana. To call for assistance if getting up.

## 2018-06-13 NOTE — PROGRESS NOTES
Post-Operative Day Number 2 Progress Note    Patient doing well post-op day 2 from  delivery without significant complaints. Pain controlled on current medication. Voiding without difficulty, normal lochia. Vitals:  Patient Vitals for the past 8 hrs:   BP Temp Pulse Resp SpO2   18 0710 104/57 98.4 °F (36.9 °C) 73 18 97 %   18 0350 93/56 98.4 °F (36.9 °C) 70 18 96 %   18 0202 99/46 - 72 20 -     Temp (24hrs), Av.3 °F (36.8 °C), Min:98 °F (36.7 °C), Max:98.7 °F (37.1 °C)      Vital signs stable, afebrile. Exam:  Patient without distress. Abdomen soft, fundus firm at level of umbilicus, non tender. Incision dry and clean without erythema. Lower extremities are negative for swelling, cords or tenderness. Assessment and Plan:  Patient appears to be having uncomplicated post- course. Continue routine post-op care and maternal education.

## 2018-06-13 NOTE — PROGRESS NOTES
Assessment completed using  phone. Patient requesting pain med and itching medication. Asked patient about dizziness with prior medication. Patient states she had eaten breakfast and was okay at this time. Explained to patient to use Hibiclens only on incision in shower. Voiced understanding.   All questions asked and answered

## 2018-06-13 NOTE — LACTATION NOTE
This note was copied from a baby's chart. In to check on feedings.  phone used for visit. Mom breastfeeding at time of visit. Mom reports baby continues to nurse well. Minimal feedings documented in chart in last 24 hours but mom states infant fed well overnight. Mom did not pump or supplement formula after lactation visit yesterday. Encouraged at least 8 feedings in 24 hours. Continue to feed on demand. Mom denies needs or questions. Lactation to follow up tomorrow before discharge.

## 2018-06-14 VITALS
DIASTOLIC BLOOD PRESSURE: 68 MMHG | RESPIRATION RATE: 16 BRPM | TEMPERATURE: 98 F | SYSTOLIC BLOOD PRESSURE: 104 MMHG | HEART RATE: 77 BPM | OXYGEN SATURATION: 98 %

## 2018-06-14 PROCEDURE — 90715 TDAP VACCINE 7 YRS/> IM: CPT | Performed by: OBSTETRICS & GYNECOLOGY

## 2018-06-14 PROCEDURE — 74011250636 HC RX REV CODE- 250/636: Performed by: OBSTETRICS & GYNECOLOGY

## 2018-06-14 PROCEDURE — 74011250637 HC RX REV CODE- 250/637: Performed by: OBSTETRICS & GYNECOLOGY

## 2018-06-14 RX ADMIN — PRENATAL VIT W/ FE FUMARATE-FA TAB 27-0.8 MG 1 TABLET: 27-0.8 TAB at 08:51

## 2018-06-14 RX ADMIN — IBUPROFEN 800 MG: 800 TABLET ORAL at 08:51

## 2018-06-14 RX ADMIN — OXYCODONE HYDROCHLORIDE AND ACETAMINOPHEN 2 TABLET: 5; 325 TABLET ORAL at 06:28

## 2018-06-14 RX ADMIN — SIMETHICONE CHEW TAB 80 MG 80 MG: 80 TABLET ORAL at 08:51

## 2018-06-14 RX ADMIN — OXYCODONE HYDROCHLORIDE AND ACETAMINOPHEN 2 TABLET: 5; 325 TABLET ORAL at 12:15

## 2018-06-14 RX ADMIN — IBUPROFEN 800 MG: 800 TABLET ORAL at 02:47

## 2018-06-14 RX ADMIN — TETANUS TOXOID, REDUCED DIPHTHERIA TOXOID AND ACELLULAR PERTUSSIS VACCINE, ADSORBED 0.5 ML: 5; 2.5; 8; 8; 2.5 SUSPENSION INTRAMUSCULAR at 08:51

## 2018-06-14 RX ADMIN — SENNOSIDES AND DOCUSATE SODIUM 2 TABLET: 8.6; 5 TABLET ORAL at 08:51

## 2018-06-14 RX ADMIN — OXYCODONE HYDROCHLORIDE AND ACETAMINOPHEN 2 TABLET: 5; 325 TABLET ORAL at 02:48

## 2018-06-14 NOTE — LACTATION NOTE
In to see mom and infant prior to discharge to home. Mom continues to breast feed and is also formula feeding infant. Mom stated that she does not have a breast pump at this time but she does plan to get one. Mom feels confident and has no concerns in regards to breastfeeding. Mom and infant are following up with [facility] and will see lactation consultant there.

## 2018-06-14 NOTE — PROGRESS NOTES
Pt discharged to home after ID bands verified and newborns HUGS tag removed. Prescription given, Discharge teaching complete, pt verbalizes understanding; questions encouraged. Mom walked to vehicle, while Dad carried baby to car and placed in rear facing car seat. Stable at discharge.        [facility]  present

## 2018-06-14 NOTE — PROGRESS NOTES
Pt is S/P repeat  at 38 6/7 weeks due to prev C/W, labor. No complaints today. Normal PO pain. No GI/ issues. Lochia < menses. No F/C. Visit Vitals    /68 (BP 1 Location: Left arm, BP Patient Position: At rest)    Pulse 77    Temp 98 °F (36.7 °C)    Resp 16    SpO2 98%    Breastfeeding Yes        CV - RRR  LUNGS - CTA bilaterally  ABD - soft, appropriate tenderness, incision C/D/I  EXT - tr edema bilaterally    Labs:  No results found for this or any previous visit (from the past 24 hour(s)). POD #3 LTCS     Pt is breast feeding. No problems currently  Stable, cont.  routine PP/PO instructions    Kushal Lopez MD  12:37 PM  18

## 2018-06-14 NOTE — DISCHARGE INSTRUCTIONS
Después del parto (período de posparto): Instrucciones de cuidado - [ After Your Delivery (the Postpartum Period): Care Instructions ]  Instrucciones de cuidado    Felicidades por el nacimiento de luevano bebé. Al igual que el Gianna, el tiempo con el recién nacido puede ser un momento de [place], [place] y agotamiento. Es posible que se sienta serrano al mirar la sulema de luevano pequeño bebé. También podría sentirse abrumada por luevano nuevo ritmo de sueño y las nuevas responsabilidades. Al principio, los bebés suelen dormir zoraida el día y permanecen despiertos zoraida la noche. No tienen ningún patrón ni rutina. Podrían luis gritos ahogados, sacudirse y despertarse, o parecer kenyatta si tuvieran los ojos cruzados (bizcos). Todo esto es normal, e incluso la pueden hacer sonreír. Zoraida las primeras semanas siguientes al parto, trate de cuidarse nataliia. Podría tardar de 4 a 6 semanas en volver a sentirse usted misma, y posiblemente más tiempo si le alcaraz hecho maximo cesárea. Es probable que se sienta muy fatigada zoraida varias semanas. Viviana días estarán llenos de [place], mikayla también de mucha alegría. La atención de seguimiento es maximo parte clave de luevano tratamiento y seguridad. Asegúrese de hacer y acudir a todas las citas, y llame a luevano médico si está teniendo problemas. También es maximo buena idea saber los resultados de los exámenes y mantener maximo lista de los medicamentos que blanca. ¿Cómo puede cuidarse en el hogar? Cuide luevano cuerpo después del parto  · Utilice toallas sanitarias en vez de tampones para las pérdidas de taniya que podrían durar hasta 2 semanas. · Alivie los cólicos con ibuprofeno (Advil, Motrin). · Alivie el dolor de las hemorroides y la marito entre la vagina y el recto con compresas de hielo o de infusión de hamamelis Josefa Latisha (\"witch hazel\"). · Alivie el estreñimiento bebiendo abundante líquido y comiendo alimentos ricos en fibra.  Pregúntele a luevano médico acerca de los ablandadores de heces de Isa [place]. · Límpiese con un chorrito suave de agua tibia de maximo botella en vez de hacerlo con papel higiénico.  · Zearing un baño de asiento en agua tibia varias veces al día. · Use un buen sostén de lactancia. Alivie el dolor y la hinchazón de los senos con toallitas de aseo húmedas tibias. · Si no está amamantando, utilice hielo en lugar de calor para aliviar el dolor de los senos. · Si está amamantando, luevano período menstrual podría no comenzar hasta después de varios meses. Es posible que Laureano, y más tiempo al principio, de kenyatta lo hacía antes del Sonjia Conquest. · Espere hasta que haya sanado (de 4 a 6 semanas) antes de volver a tener relaciones sexuales. Luevano médico le dirá cuándo puede tener relaciones sexuales. · Trate de no viajar con el bebé zoraida las primeras 5 o 6 semanas. Si hace un viaje brock en automóvil, bernie paradas frecuentes para caminar y estirarse. Evite el agotamiento  · Descanse todos los días. Trate de dormir la siesta cuando luevano bebé también lo hace. · Pídale a otro adulto que la acompañe por unos lulú después del [place]. · Planifique el cuidado de los niños si tiene otros hijos. · Sea flexible para que pueda comer a horas fuera de lo común y dormir cuando lo necesite. Tanto usted kenyatta luevano bebé están creando horarios nuevos. · Planee pequeñas salidas para estar afuera de la casa. El cambio podría hacer que se sienta menos fatigada. · Pida ayuda para cocinar y [address] hogar y las compras. Recuerde que luevano principal tarea consiste en cuidar a luevano bebé. Conozca la ayuda que puede recibir en kristan de tener depresión posparto  · La depresión posparto es común zoraida las primeras 1 a 2 semanas siguientes al nacimiento. Podría llorar o sentirse rafat o irritable sin razón alguna. · Descanse cada vez que pueda hacerlo. Estar fatigada le dificulta manejar lucita emociones. · Salga a caminar con luevano bebé.   · Hable con luevano andi, lucita amigos y lucita familiares acerca de lucita sentimientos. · Si lucita síntomas marcus más de unas pocas semanas, o si se siente muy deprimida, pídale ayuda a luevano médico.  · La depresión posparto puede tratarse. Los grupos de apoyo y la asesoría psicológica pueden ser de [facility]. A veces, los medicamentos también pueden ayudar. Manténgase saludable  · Coma alimentos sanos para tener más energía, producir maximo buena [place] materna y adelgazar las libras adicionales que engordó con el bebé. · Si amamanta, evite el alcohol y las drogas. No fume. Si dejó de fumar zoraida el embarazo, felicitaciones. · Inicie ejercicios diarios después de 4 a 6 semanas, mikayla descanse cuando se sienta fatigada. · Aprenda ejercicios para tonificar el abdomen. Dasha los ejercicios de [place]eg[place] para recuperar la fuerza de los músculos pélvicos. Puede hacer los ejercicios de Kegel mientras está de pie o sentada. ¨ Apriete los mismos músculos que usted usaría para detener la [place]. El abdomen y los muslos no deben moverse. ¨ Manténgalos apretados zoraida 3 segundos y, luego, relájelos otros 3 segundos. ¨ Empiece con 3 segundos. Cecillia Nito, añada 1 mony cada semana hasta que sea capaz de apretar zoraida 10 segundos. ¨ Repita el ejercicio entre 10 y 13 veces cada sesión. Dasha nat o más sesiones cada día. · Busque maximo clase para nuevas madres y recién nacidos que tenga un tiempo de ejercicio. · Si le alcaraz hecho maximo cesárea, dese un poco más de tiempo antes de hacer ejercicio, y tenga cuidado. ¿Cuándo debe pedir ayuda? Llame al 911 en cualquier momento que considere que necesita atención de [place]. Por ejemplo, llame si:  ? · Se desmayó (perdió el conocimiento). ?Llame a luevano médico ahora mismo o busque atención médica inmediata si:  ? · Tiene sangrado vaginal intenso. Sexton significa que está expulsando coágulos sanguíneos y empapando maximo toalla sanitaria cada hora por 2 horas o más. ? · Está mareada o aturdida, o siente kenyatta que se puede [address] [place] [address] 15. ? · Tiene fiebre.    ? · Tiene dolor abdominal nuevo o que empeora. ?Preste especial atención a los cambios en luevano cecilio y asegúrese de comunicarse con luevano médico si:  ? · Luevano sangrado vaginal parece volverse más intenso. ? · Tiene flujo vaginal nuevo o que empeora. ? · Se siente rafat, ansiosa o sin esperanzas zoraida más de unos pocos días. ? · No mejora kenyatta se esperaba. ¿Dónde puede encontrar más información en inglés? Cynda Boast a [email or web address]-shalom.[email or web address] Crystal Yuan V060 en la búsqueda para aprender más acerca de \"Después del parto (período de posparto): Instrucciones de cuidado - [ After Your Delivery (the Postpartum Period): Care Instructions ]. \"  Revisado: 16 marzo, 2017  Versión del contenido: 11.4  © 8879-5059 Healthwise, Incorporated. Las instrucciones de cuidado fueron adaptadas bajo licencia por [employer] (which disclaims liability or warranty for this information). Si usted tiene [place] afección médica o sobre estas instrucciones, siempre pregunte a luevano profesional de cecilio. Healthwise, Incorporated niega toda garantía o responsabilidad por luevano uso de esta información. DISCHARGE SUMMARY from Nurse    PATIENT INSTRUCTIONS:    After general anesthesia or intravenous sedation, for 24 hours or while taking prescription Narcotics:  · Limit your activities  · Do not drive and operate hazardous machinery  · Do not make important personal or business decisions  · Do  not drink alcoholic beverages  · If you have not urinated within 8 hours after discharge, please contact your surgeon on call.     Report the following to your surgeon:  · Excessive pain, swelling, redness or odor of or around the surgical area  · Temperature over 100.5  · Nausea and vomiting lasting longer than 4 hours or if unable to take medications  · Any signs of decreased circulation or nerve impairment to extremity: change in color, persistent  numbness, tingling, coldness or increase pain  · Any questions    What to do at Home:    Discharge instruction to follow: Activity: Pelvis rest for 6 weeks, Nothing in vagina for 6 weeks     No heavy lifting over 15 lbs for 2 weeks     No driving for 2 weeks     No push/pull motion such as sweeping or vacuuming for 2 weeks     No tub baths or swimming for 6 weeks     section keep incision clean and dry, may shower as normal with soap and water. Inspect incision every day for signs of infection listed below. Continue to use coretta-bottle with every void or bowel movement until comfortable stopping. Change sanitary pad after each urination or bowel movement. Call MD for the following:      Fever over 101 F; pain not relieved by medication; foul smelling vaginal discharge or increase in vaginal bleeding. Redness, swelling, or drainage from  incision. Take medication as prescribed. Follow up with MD as order. *  Please give a list of your current medications to your Primary Care Provider. *  Please update this list whenever your medications are discontinued, doses are      changed, or new medications (including over-the-counter products) are added. *  Please carry medication information at all times in case of emergency situations. These are general instructions for a healthy lifestyle:    No smoking/ No tobacco products/ Avoid exposure to second hand smoke  Surgeon General's Warning:  Quitting smoking now greatly reduces serious risk to your health. Obesity, smoking, and sedentary lifestyle greatly increases your risk for illness    A healthy diet, regular physical exercise & weight monitoring are important for maintaining a healthy lifestyle    You may be retaining fluid if you have a history of heart failure or if you experience any of the following symptoms:  Weight gain of 3 pounds or more overnight or 5 pounds in a week, increased swelling in our hands or feet or shortness of breath while lying flat in bed.   Please call your doctor as soon as you notice any of these symptoms; do not wait until your next office visit. Recognize signs and symptoms of STROKE:    F-face looks uneven    A-arms unable to move or move unevenly    S-speech slurred or non-existent    T-time-call 911 as soon as signs and symptoms begin-DO NOT go       Back to bed or wait to see if you get better-TIME IS BRAIN. Warning Signs of HEART ATTACK     Call 911 if you have these symptoms:   Chest discomfort. Most heart attacks involve discomfort in the center of the chest that lasts more than a few minutes, or that goes away and comes back. It can feel like uncomfortable pressure, squeezing, fullness, or pain.  Discomfort in other areas of the upper body. Symptoms can include pain or discomfort in one or both arms, the back, neck, jaw, or stomach.  Shortness of breath with or without chest discomfort.  Other signs may include breaking out in a cold sweat, nausea, or lightheadedness. Don't wait more than five minutes to call 911 - MINUTES MATTER! Fast action can save your life. Calling 911 is almost always the fastest way to get lifesaving treatment. Emergency Medical Services staff can begin treatment when they arrive -- up to an hour sooner than if someone gets to the hospital by car. The discharge information has been reviewed with the patient. The patient verbalized understanding. Discharge medications reviewed with the patient and appropriate educational materials and side effects teaching were provided.   ___________________________________________________________________________________________________________________________________          INSTRUCCIONES PARA EL PACIENTE:    Después de anestesia general o sedación intravenosa, zoraida 24 horas o mientras blanca narcóticos recetados:  Limite lucita actividades  No maneje ni opere maquinaria peligrosa  No tomes decisiones personales o comerciales importantes  No bebas bebidas alcohólicas  Si no ha [employer] 8 horas posteriores al sara, comuníquese con luevano cirujano de [facility]. Informe lo siguiente a luevano cirujano:  Dolor excesivo, hinchazón, enrojecimiento u [employer] de o alrededor del área quirúrgica  Temperatura sobre 100.5  Náuseas y vómitos que marcus más de 4 horas o si no puede rinku medicamentos  Cualquier signo de disminución de la circulación o deterioro nervioso a las extremidades: cambio de color, entumecimiento persistente, hormigueo, frío o aumento del dolor  Alguna pregunta    Qué hacer en casa: Instrucciones de descarga a seguir:  Actividad: descanso de la pelvis zoraida 6 semanas, nada en la vagina zoraida 6 semanas  Sin levantar objetos pesados ? ?de más de 15 lbs por 2 semanas  No conducir zoraida 2 semanas  Sin movimiento de empuje / tracción, kenyatta barrer o pasar la aspiradora zoraida 2 semanas  Ambler de bañeras ni natación zoraida 6 semanas    La sección cesárea mantiene la incisión limpia y [place] Pafos, puede ducharse normalmente con agua y Igor. Inspeccione la incisión todos los días para detectar signos de infección enumerados a continuación. Continúe usando coretta-bottle con cada espacio vacío o evacuación intestinal hasta que se detenga cómodamente. Cambie la compresa sanitaria después de cada micción o defecación. Llame a MD para lo siguiente:  [employer] de 80 F; dolor que no se marshal con medicamentos; flujo vaginal maloliente o aumento en el sangrado vaginal. Enrojecimiento, hinchazón o drenaje de maximo incisión cesárea. Laflin la medicación según lo prescrito. Mareilena Late un seguimiento con MD Ryne Knight. * Proporcione maximo lista de lucita medicamentos actuales a luevano [employer] * Actualice esta lista siempre que lucita medicamentos se suspendan, las dosis son  [place], o se agregan nuevos medicamentos (incluidos los productos de [place]). * Lleve la información del medicamento en todo momento en kristan de situaciones de emergencia.     Estas son instrucciones generales para un estilo de jalil saludable:    No fumar / No fumar productos / [employer] exposición al humo de segunda mano  Advertencia del cirujano general: dejar de fumar ahora reduce mucho el riesgo grave para luevano cecilio. La obesidad, el tabaquismo y el estilo de jalil sedentario [place] en gran medida el riesgo de enfermedad    Maximo dieta saludable, ejercicio físico regular y control de peso son importantes para mantener un estilo de jalil saludable    Puede estar reteniendo líquido si tiene antecedentes de insuficiencia cardíaca o si experimenta alguno de los siguientes síntomas: aumento de peso de 3 libras o más zoraida la noche o 5 libras en maximo semana, aumento de la hinchazón en las yonathan o los pies o dificultad para respirar mientras acostado en la cama. Llame a luevano médico tan pronto kenyatta note alguno de estos síntomas; no esperes hasta tu próxima visita a la oficina. Reconozca los signos y síntomas de STROKE:    F-face se ve desigual    A: brazos incapaces de moverse o moverse de forma desigual    S-speech arrastrado o inexistente    T-time-call 911 tan pronto kenyatta comiencen los signos y síntomas-DO NOT go      Vuelva a la cama o espere a felisa si mejora. EL TIEMPO ES CEREBRO. Señales de advertencia de ataque al corazón    Llame al 911 si tiene estos síntomas:  Molestias en el pecho. La mayoría de los ataques al corazón implican molestias en el centro del pecho que marcus más de unos minutos, o que desaparecen y [place]. Puede sentirse kenyatta maximo presión incómoda, exprimido, lleno o dolor. Molestias en otras áreas de la parte superior del cuerpo. Los síntomas pueden incluir dolor o incomodidad en susie o ambos brazos, la espalda, el jacque, la mandíbula o el BJURHOLM. Falta de aliento con o sin molestias en el pecho. Otros signos pueden incluir un sudor frío, náuseas o aturdimiento. ¡No espere más de carrillo minutos para llamar al 911 - MINUTES MATE! La acción rápida puede salvarle la jalil.  Llamar al 911 es tracie siempre la forma más rápida de obtener un tratamiento que salve vidas. El personal de los [employer] puede comenzar el tratamiento cuando llegan, hasta maximo hora antes que si alguien llega al hospital en automóvil. La información de sara ha sido revisada con el paciente. El paciente verbalizó la comprensión. Se revisaron los medicamentos de sara con el paciente y se proporcionaron los materiales educativos apropiados y la enseñanza de los efectos secundarios.   ___________________________________________________________________________________________________________________________________

## 2018-06-15 NOTE — DISCHARGE SUMMARY
Obstetrical Discharge Summary     Name: Naeem Ford MRN: [id number]  SSN: [id number]    YOB: 1990  Age: 32 y.o. Sex: female      Allergies: Review of patient's allergies indicates no known allergies. Admit Date: 2018    Discharge Date: 2018    Admitting Physician: Reji Ayon MD     Attending Physician:  Reji Ayon MD     * Admission Diagnoses: TAI 2018 Repeat  Section;Previous  sec*    * Discharge Diagnoses:   Information for the patient's :  Pricilla Williamson [[id number]]   Delivery of a 6 lb 9.1 oz (2.98 kg) male infant via , Low Transverse on 2018 at 3:56 PM  by . Apgars were 9 and 9.        Additional Diagnoses:   Hospital Problems as of 2018  Date Reviewed: 2018          Codes Class Noted - Resolved POA    * (Principal)S/P  section ICD-10-CM: Z98.891  ICD-9-CM: V45.89  2018 - Present Yes        RESOLVED: Normal labor ICD-10-CM: O80, Z37.9  ICD-9-CM: 441  2018 - 2018 Yes             Lab Results   Component Value Date/Time    ABO/Rh(D) O POSITIVE 2018 02:24 PM    Rubella, External Immune 10/24/2017    GrBStrep, External Negative 2018    ABO,Rh O Positive 10/28/2017      Immunization History   Administered Date(s) Administered    Tdap 2018       * Procedures:   SECTION - TAI 2018 Repeat  Section      [place]  Depression Scale  I have been able to laugh and see the funny side of things: Definitely not so much now  I have looked forward with enjoyment to things: As much as I ever did  I have blamed myself unnecessarily when things went wrong: No, never  I have been anxious or worried for no good reason: Yes, sometimes  I have felt scared or panicky for no very good reason: Yes, sometimes  Things have been getting on top of me: No, most of the time I have coped quite well  I have been so unhappy that I have had difficulty sleeping: Yes, sometimes  I have felt sad or miserable: Not very often  I have been so unhappy that I have been crying: Only occasionally  The thought of harming myself has occurred to me: Never  Total Score: 11    * Discharge Condition: good    [facility] Course: Normal hospital course following the delivery. * Disposition: Home    Discharge Medications:   Discharge Medication List as of 6/14/2018 10:35 AM      START taking these medications    Details   ibuprofen (MOTRIN) 800 mg tablet Take 1 Tab by mouth every six (6) hours as needed., Normal, Disp-30 Tab, R-1      oxyCODONE-acetaminophen (PERCOCET) 5-325 mg per tablet Take 1-2 Tabs by mouth every four (4) hours as needed. Max Daily Amount: 12 Tabs., Print, Disp-40 Tab, R-0         CONTINUE these medications which have NOT CHANGED    Details   ferrous sulfate (IRON) 325 mg (65 mg iron) EC tablet Take 1 Tab by mouth daily. Indications: Iron Deficiency Anemia, Normal, Disp-30 Tab, R-3      prenatal vit-iron fumarate-fa 27 mg iron- 0.8 mg tab tablet Take 1 Tab by mouth daily. Indications: pregnancy, Print, Disp-30 Tab, R-12         STOP taking these medications       HYDROcodone-acetaminophen (NORCO) 7.5-325 mg per tablet Comments:   Reason for Stopping:               * Follow-up Care/Patient Instructions:   Activity: Activity as tolerated and No driving while on analgesics  Diet: Regular Diet  Wound Care: Keep wound clean and dry    Follow-up Information     Follow up With Details Comments [address], MD On 6/22/2018 at 10:15 am [address] [place] [address] 2  [phone]             Signed By:  Diamond Rand MD     Kamini 15, 2018

## 2023-12-11 NOTE — PROGRESS NOTES
EPDS = 11. Social service consult ordered. How Severe Is It?: moderate Is This A New Presentation, Or A Follow-Up?: Follow Up Accutane

## (undated) DEVICE — SUT CHRMC 3-0 27IN SH BRN --

## (undated) DEVICE — SUTURE VCRL SZ 3-0 L36IN ABSRB UD L36MM CT-1 1/2 CIR J944H

## (undated) DEVICE — (D)PREP SKN CHLRAPRP APPL 26ML -- CONVERT TO ITEM 371833

## (undated) DEVICE — MEDI-VAC NON-CONDUCTIVE SUCTION TUBING: Brand: CARDINAL HEALTH

## (undated) DEVICE — KENDALL SCD EXPRESS SLEEVES, KNEE LENGTH, MEDIUM: Brand: KENDALL SCD

## (undated) DEVICE — SUT CHRMC 1 36IN CTX BRN --

## (undated) DEVICE — STERILE POLYISOPRENE POWDER-FREE SURGICAL GLOVES: Brand: PROTEXIS

## (undated) DEVICE — CATH FOL TY IC BAG 16FR 2000ML -- CONVERT TO ITEM 363158

## (undated) DEVICE — REM POLYHESIVE ADULT PATIENT RETURN ELECTRODE: Brand: VALLEYLAB

## (undated) DEVICE — SUTURE VCRL SZ 0 L36IN ABSRB UD L36MM CT-1 1/2 CIR J946H

## (undated) DEVICE — SOLUTION IRRIG 1000ML H2O STRL BLT

## (undated) DEVICE — SURGICAL PROCEDURE PACK C SECT CDS

## (undated) DEVICE — SOLUTION IV 1000ML 0.9% SOD CHL

## (undated) DEVICE — SUTURE VCRL SZ 4-0 L27IN ABSRB UD L60MM KS STR REV CUT NDL J662H

## (undated) DEVICE — (D)STRIP SKN CLSR 0.5X4IN WHT --

## (undated) DEVICE — PENCIL ES L3M BTTN SWCH S STL HEX LOK BLDE ELECTRD HOLSTER

## (undated) DEVICE — Device: Brand: PORTEX